# Patient Record
Sex: FEMALE | Race: WHITE | NOT HISPANIC OR LATINO | Employment: UNEMPLOYED | URBAN - METROPOLITAN AREA
[De-identification: names, ages, dates, MRNs, and addresses within clinical notes are randomized per-mention and may not be internally consistent; named-entity substitution may affect disease eponyms.]

---

## 2021-09-29 ENCOUNTER — TRANSCRIBE ORDERS (OUTPATIENT)
Dept: PREADMISSION TESTING | Facility: HOSPITAL | Age: 56
End: 2021-09-29

## 2021-09-30 ENCOUNTER — HOSPITAL ENCOUNTER (OUTPATIENT)
Dept: GENERAL RADIOLOGY | Facility: HOSPITAL | Age: 56
Discharge: HOME | End: 2021-09-30

## 2021-09-30 ENCOUNTER — PRE-ADMISSION TESTING (OUTPATIENT)
Dept: PREADMISSION TESTING | Facility: HOSPITAL | Age: 56
End: 2021-09-30

## 2021-09-30 VITALS
RESPIRATION RATE: 20 BRPM | DIASTOLIC BLOOD PRESSURE: 91 MMHG | HEIGHT: 60 IN | WEIGHT: 158 LBS | TEMPERATURE: 99 F | SYSTOLIC BLOOD PRESSURE: 133 MMHG | BODY MASS INDEX: 31.02 KG/M2 | HEART RATE: 94 BPM | OXYGEN SATURATION: 98 %

## 2021-09-30 LAB
ALBUMIN SERPL-MCNC: 4.6 G/DL (ref 3.5–5.2)
ALBUMIN/GLOB SERPL: 2 G/DL
ALP SERPL-CCNC: 64 U/L (ref 39–117)
ALT SERPL W P-5'-P-CCNC: 32 U/L (ref 1–33)
ANION GAP SERPL CALCULATED.3IONS-SCNC: 14.1 MMOL/L (ref 5–15)
AST SERPL-CCNC: 32 U/L (ref 1–32)
BILIRUB SERPL-MCNC: 0.4 MG/DL (ref 0–1.2)
BUN SERPL-MCNC: 20 MG/DL (ref 6–20)
BUN/CREAT SERPL: 23.3 (ref 7–25)
CALCIUM SPEC-SCNC: 9.5 MG/DL (ref 8.6–10.5)
CHLORIDE SERPL-SCNC: 102 MMOL/L (ref 98–107)
CO2 SERPL-SCNC: 23.9 MMOL/L (ref 22–29)
CREAT SERPL-MCNC: 0.86 MG/DL (ref 0.57–1)
DEPRECATED RDW RBC AUTO: 43.8 FL (ref 37–54)
ERYTHROCYTE [DISTWIDTH] IN BLOOD BY AUTOMATED COUNT: 12.8 % (ref 12.3–15.4)
GFR SERPL CREATININE-BSD FRML MDRD: 68 ML/MIN/1.73
GLOBULIN UR ELPH-MCNC: 2.3 GM/DL
GLUCOSE SERPL-MCNC: 156 MG/DL (ref 65–99)
HBA1C MFR BLD: 5.72 % (ref 4.8–5.6)
HCT VFR BLD AUTO: 39.2 % (ref 34–46.6)
HGB BLD-MCNC: 13.2 G/DL (ref 12–15.9)
INR PPP: 0.93 (ref 0.9–1.1)
MCH RBC QN AUTO: 31.6 PG (ref 26.6–33)
MCHC RBC AUTO-ENTMCNC: 33.7 G/DL (ref 31.5–35.7)
MCV RBC AUTO: 93.8 FL (ref 79–97)
PLATELET # BLD AUTO: 305 10*3/MM3 (ref 140–450)
PMV BLD AUTO: 9.3 FL (ref 6–12)
POTASSIUM SERPL-SCNC: 3.8 MMOL/L (ref 3.5–5.2)
PROT SERPL-MCNC: 6.9 G/DL (ref 6–8.5)
PROTHROMBIN TIME: 12.3 SECONDS (ref 11.7–14.2)
QT INTERVAL: 393 MS
RBC # BLD AUTO: 4.18 10*6/MM3 (ref 3.77–5.28)
SODIUM SERPL-SCNC: 140 MMOL/L (ref 136–145)
WBC # BLD AUTO: 8.58 10*3/MM3 (ref 3.4–10.8)

## 2021-09-30 PROCEDURE — 36415 COLL VENOUS BLD VENIPUNCTURE: CPT

## 2021-09-30 PROCEDURE — 93005 ELECTROCARDIOGRAM TRACING: CPT

## 2021-09-30 PROCEDURE — 73560 X-RAY EXAM OF KNEE 1 OR 2: CPT

## 2021-09-30 PROCEDURE — 83036 HEMOGLOBIN GLYCOSYLATED A1C: CPT

## 2021-09-30 PROCEDURE — 85610 PROTHROMBIN TIME: CPT

## 2021-09-30 PROCEDURE — 71046 X-RAY EXAM CHEST 2 VIEWS: CPT

## 2021-09-30 PROCEDURE — 85027 COMPLETE CBC AUTOMATED: CPT

## 2021-09-30 PROCEDURE — 93010 ELECTROCARDIOGRAM REPORT: CPT | Performed by: INTERNAL MEDICINE

## 2021-09-30 PROCEDURE — 80053 COMPREHEN METABOLIC PANEL: CPT

## 2021-09-30 RX ORDER — ATORVASTATIN CALCIUM 20 MG/1
20 TABLET, FILM COATED ORAL NIGHTLY
COMMUNITY

## 2021-09-30 RX ORDER — SENNOSIDES 8.6 MG
1300 CAPSULE ORAL EVERY 8 HOURS PRN
COMMUNITY
End: 2021-10-13 | Stop reason: HOSPADM

## 2021-09-30 RX ORDER — VENLAFAXINE HYDROCHLORIDE 150 MG/1
150 CAPSULE, EXTENDED RELEASE ORAL EVERY MORNING
COMMUNITY

## 2021-09-30 RX ORDER — DICLOFENAC SODIUM 75 MG/1
75 TABLET, DELAYED RELEASE ORAL 2 TIMES DAILY
COMMUNITY

## 2021-09-30 RX ORDER — LEVOTHYROXINE SODIUM 0.1 MG/1
100 TABLET ORAL
COMMUNITY

## 2021-09-30 RX ORDER — HYDROCHLOROTHIAZIDE 12.5 MG/1
12.5 TABLET ORAL EVERY MORNING
COMMUNITY

## 2021-09-30 ASSESSMENT — KOOS JR
KOOS JR SCORE: 23
KOOS JR SCORE: 28.251

## 2021-10-01 NOTE — PAT
Eval by APRN prior to RTKA.  Has had prev L knee scope but this is first joint replacement.  No issues w/ anesthesia prev.  PMH includes OA, hyperlipidemia, thyroid disorder, and tobacco use.  Denies any recent acute illnesses.  No c/o except for knee pain.  Has avoided Covid , but is not vaccinated.  Exam unremarkable.  Neuro grossly intact.  Chest CTA but harsh bilat.  S1S2 RRR.  No edema noted.    All PAT testing reviewed and may proceed with surgery.

## 2021-10-11 ENCOUNTER — TELEPHONE (OUTPATIENT)
Dept: ORTHOPEDIC SURGERY | Facility: HOSPITAL | Age: 56
End: 2021-10-11

## 2021-10-11 ENCOUNTER — LAB (OUTPATIENT)
Dept: LAB | Facility: HOSPITAL | Age: 56
End: 2021-10-11

## 2021-10-11 DIAGNOSIS — Z20.822 ENCOUNTER FOR LABORATORY TESTING FOR COVID-19 VIRUS: Primary | ICD-10-CM

## 2021-10-11 PROCEDURE — C9803 HOPD COVID-19 SPEC COLLECT: HCPCS

## 2021-10-11 PROCEDURE — U0004 COV-19 TEST NON-CDC HGH THRU: HCPCS

## 2021-10-11 NOTE — TELEPHONE ENCOUNTER
Pain Risk:  ? Currently on narcotics  ? ETOH > 3 drinks/day  ? Pain management patient  ? Current cannaboid use  No issues  Cardiac-Neuro Risk:  ? Arrhythmias  ? Stent/MI  ? Pacer  ? Heart Failure  ? Stroke with residual Choose an item.  No issues  Respiratory Risk:  ? Sleep apnea  ? CPAP machine use  ? Nightly snoring  ? Asthma/COPD   No issues  Diabetic Risk:  HgA1C (within 90 days prior to PAT):  5.72  ? Insulin use  ? More than 1 diabetic medication  No issues   Urinary retention:  No    Caregiver 24-48hrs post-discharge: Home with daughter    Home needs:  ? None/will need before discharge  ? Have walker and/or cane  ? Steps 3 steps to get into the house   Discharge Plan:    [address] [facility]    Prescriptions: Meds to bed      Home medications:   ? Blood thinner/anti-coag therapy  ? BPH or diuretic- HCTZ  ? BP meds  Educate patient on spinal anesthesia/pain control:  ? patient verbalize understanding    Educate patient on hospital course/timeline:  ?  patient verbalize understanding    Joint Care Class:  ?  yes ? no  Doing it this week.

## 2021-10-12 LAB — SARS-COV-2 RNA NOSE QL NAA+PROBE: NOT DETECTED

## 2021-10-13 ENCOUNTER — ANESTHESIA (OUTPATIENT)
Dept: PERIOP | Facility: HOSPITAL | Age: 56
End: 2021-10-13

## 2021-10-13 ENCOUNTER — HOSPITAL ENCOUNTER (OUTPATIENT)
Facility: HOSPITAL | Age: 56
Setting detail: HOSPITAL OUTPATIENT SURGERY
Discharge: HOME HEALTH | End: 2021-10-13
Attending: ORTHOPAEDIC SURGERY | Admitting: ORTHOPAEDIC SURGERY

## 2021-10-13 ENCOUNTER — ANESTHESIA EVENT (OUTPATIENT)
Dept: PERIOP | Facility: HOSPITAL | Age: 56
End: 2021-10-13

## 2021-10-13 ENCOUNTER — APPOINTMENT (OUTPATIENT)
Dept: GENERAL RADIOLOGY | Facility: HOSPITAL | Age: 56
End: 2021-10-13

## 2021-10-13 VITALS
HEART RATE: 80 BPM | BODY MASS INDEX: 31.41 KG/M2 | OXYGEN SATURATION: 97 % | DIASTOLIC BLOOD PRESSURE: 72 MMHG | RESPIRATION RATE: 16 BRPM | HEIGHT: 60 IN | TEMPERATURE: 97.8 F | WEIGHT: 160 LBS | SYSTOLIC BLOOD PRESSURE: 112 MMHG

## 2021-10-13 DIAGNOSIS — Z96.651 TOTAL KNEE REPLACEMENT STATUS, RIGHT: Primary | ICD-10-CM

## 2021-10-13 PROCEDURE — C1713 ANCHOR/SCREW BN/BN,TIS/BN: HCPCS | Performed by: ORTHOPAEDIC SURGERY

## 2021-10-13 PROCEDURE — 25010000002 ROPIVACAINE PER 1 MG

## 2021-10-13 PROCEDURE — 25010000002 KETOROLAC TROMETHAMINE PER 15 MG

## 2021-10-13 PROCEDURE — C1776 JOINT DEVICE (IMPLANTABLE): HCPCS | Performed by: ORTHOPAEDIC SURGERY

## 2021-10-13 PROCEDURE — C1889 IMPLANT/INSERT DEVICE, NOC: HCPCS | Performed by: ORTHOPAEDIC SURGERY

## 2021-10-13 PROCEDURE — 25010000003 CEFAZOLIN IN DEXTROSE 2-4 GM/100ML-% SOLUTION: Performed by: ORTHOPAEDIC SURGERY

## 2021-10-13 PROCEDURE — 97110 THERAPEUTIC EXERCISES: CPT

## 2021-10-13 PROCEDURE — 25010000002 DEXAMETHASONE PER 1 MG: Performed by: NURSE ANESTHETIST, CERTIFIED REGISTERED

## 2021-10-13 PROCEDURE — 25010000002 KETOROLAC TROMETHAMINE PER 15 MG: Performed by: NURSE ANESTHETIST, CERTIFIED REGISTERED

## 2021-10-13 PROCEDURE — 73560 X-RAY EXAM OF KNEE 1 OR 2: CPT

## 2021-10-13 PROCEDURE — 97161 PT EVAL LOW COMPLEX 20 MIN: CPT

## 2021-10-13 PROCEDURE — 25010000002 CLONIDINE PER 1 MG

## 2021-10-13 PROCEDURE — 25010000002 ONDANSETRON PER 1 MG: Performed by: NURSE ANESTHETIST, CERTIFIED REGISTERED

## 2021-10-13 PROCEDURE — 25010000002 MIDAZOLAM PER 1 MG: Performed by: ANESTHESIOLOGY

## 2021-10-13 PROCEDURE — 25010000002 PROPOFOL 10 MG/ML EMULSION: Performed by: NURSE ANESTHETIST, CERTIFIED REGISTERED

## 2021-10-13 PROCEDURE — 25010000002 EPINEPHRINE 1 MG/ML SOLUTION

## 2021-10-13 DEVICE — CMT BONE PALACOS R HI/VISC 1X40: Type: IMPLANTABLE DEVICE | Site: KNEE | Status: FUNCTIONAL

## 2021-10-13 DEVICE — TIBIAL INSERT FIXED SPHERE FLEX #2/12 MM R
Type: IMPLANTABLE DEVICE | Site: KNEE | Status: FUNCTIONAL
Brand: GMK SPHERE TOTAL KNEE SYSTEM

## 2021-10-13 DEVICE — TOTL KN: Type: IMPLANTABLE DEVICE | Site: KNEE | Status: FUNCTIONAL

## 2021-10-13 DEVICE — FIXED TIBIAL TRAY CEMENTED RIGHT, SIZE 2
Type: IMPLANTABLE DEVICE | Site: KNEE | Status: FUNCTIONAL
Brand: GMK PRIMARY TOTAL KNEE SYSTEM

## 2021-10-13 DEVICE — DEV CONTRL TISS STRATAFIX SPIRAL MNCRYL UD 3/0 PLS 30CM: Type: IMPLANTABLE DEVICE | Site: KNEE | Status: FUNCTIONAL

## 2021-10-13 DEVICE — FEMUR SPHERE CEMENTED RIGHT, SIZE 3
Type: IMPLANTABLE DEVICE | Site: KNEE | Status: FUNCTIONAL
Brand: GMK SPHERE TOTAL KNEE SYSTEM

## 2021-10-13 DEVICE — DEV CONTRL TISS STRATAFIX SYMM PDS PLUS VIL CT-1 60CM: Type: IMPLANTABLE DEVICE | Site: KNEE | Status: FUNCTIONAL

## 2021-10-13 RX ORDER — FENTANYL CITRATE 50 UG/ML
50 INJECTION, SOLUTION INTRAMUSCULAR; INTRAVENOUS
Status: DISCONTINUED | OUTPATIENT
Start: 2021-10-13 | End: 2021-10-13 | Stop reason: HOSPADM

## 2021-10-13 RX ORDER — ONDANSETRON 2 MG/ML
INJECTION INTRAMUSCULAR; INTRAVENOUS AS NEEDED
Status: DISCONTINUED | OUTPATIENT
Start: 2021-10-13 | End: 2021-10-13 | Stop reason: SURG

## 2021-10-13 RX ORDER — EPHEDRINE SULFATE 50 MG/ML
5 INJECTION, SOLUTION INTRAVENOUS ONCE AS NEEDED
Status: DISCONTINUED | OUTPATIENT
Start: 2021-10-13 | End: 2021-10-13 | Stop reason: HOSPADM

## 2021-10-13 RX ORDER — BUPIVACAINE HYDROCHLORIDE 7.5 MG/ML
INJECTION, SOLUTION EPIDURAL; RETROBULBAR
Status: COMPLETED | OUTPATIENT
Start: 2021-01-04 | End: 2021-10-13

## 2021-10-13 RX ORDER — DEXAMETHASONE SODIUM PHOSPHATE 4 MG/ML
INJECTION, SOLUTION INTRA-ARTICULAR; INTRALESIONAL; INTRAMUSCULAR; INTRAVENOUS; SOFT TISSUE AS NEEDED
Status: DISCONTINUED | OUTPATIENT
Start: 2021-10-13 | End: 2021-10-13 | Stop reason: SURG

## 2021-10-13 RX ORDER — MIDAZOLAM HYDROCHLORIDE 1 MG/ML
1 INJECTION INTRAMUSCULAR; INTRAVENOUS
Status: COMPLETED | OUTPATIENT
Start: 2021-10-13 | End: 2021-10-13

## 2021-10-13 RX ORDER — ACETAMINOPHEN 10 MG/ML
1000 INJECTION, SOLUTION INTRAVENOUS ONCE
Status: DISCONTINUED | OUTPATIENT
Start: 2021-10-13 | End: 2021-10-13 | Stop reason: HOSPADM

## 2021-10-13 RX ORDER — MAGNESIUM HYDROXIDE 1200 MG/15ML
LIQUID ORAL AS NEEDED
Status: DISCONTINUED | OUTPATIENT
Start: 2021-10-13 | End: 2021-10-13 | Stop reason: HOSPADM

## 2021-10-13 RX ORDER — CEFAZOLIN SODIUM 2 G/100ML
2 INJECTION, SOLUTION INTRAVENOUS ONCE
Status: COMPLETED | OUTPATIENT
Start: 2021-10-13 | End: 2021-10-13

## 2021-10-13 RX ORDER — ONDANSETRON 2 MG/ML
4 INJECTION INTRAMUSCULAR; INTRAVENOUS ONCE AS NEEDED
Status: DISCONTINUED | OUTPATIENT
Start: 2021-10-13 | End: 2021-10-13 | Stop reason: HOSPADM

## 2021-10-13 RX ORDER — FLUMAZENIL 0.1 MG/ML
0.2 INJECTION INTRAVENOUS AS NEEDED
Status: DISCONTINUED | OUTPATIENT
Start: 2021-10-13 | End: 2021-10-13 | Stop reason: HOSPADM

## 2021-10-13 RX ORDER — DIPHENHYDRAMINE HCL 25 MG
25 CAPSULE ORAL
Status: DISCONTINUED | OUTPATIENT
Start: 2021-10-13 | End: 2021-10-13 | Stop reason: HOSPADM

## 2021-10-13 RX ORDER — HYDROCODONE BITARTRATE AND ACETAMINOPHEN 7.5; 325 MG/1; MG/1
1 TABLET ORAL ONCE AS NEEDED
Status: COMPLETED | OUTPATIENT
Start: 2021-10-13 | End: 2021-10-13

## 2021-10-13 RX ORDER — IBUPROFEN 600 MG/1
600 TABLET ORAL ONCE AS NEEDED
Status: DISCONTINUED | OUTPATIENT
Start: 2021-10-13 | End: 2021-10-13 | Stop reason: HOSPADM

## 2021-10-13 RX ORDER — ASPIRIN 81 MG/1
81 TABLET ORAL 2 TIMES DAILY
Qty: 60 TABLET | Refills: 0 | Status: SHIPPED | OUTPATIENT
Start: 2021-10-13 | End: 2021-11-12

## 2021-10-13 RX ORDER — KETOROLAC TROMETHAMINE 30 MG/ML
INJECTION, SOLUTION INTRAMUSCULAR; INTRAVENOUS AS NEEDED
Status: DISCONTINUED | OUTPATIENT
Start: 2021-10-13 | End: 2021-10-13 | Stop reason: SURG

## 2021-10-13 RX ORDER — LIDOCAINE HYDROCHLORIDE 20 MG/ML
INJECTION, SOLUTION INFILTRATION; PERINEURAL AS NEEDED
Status: DISCONTINUED | OUTPATIENT
Start: 2021-10-13 | End: 2021-10-13 | Stop reason: SURG

## 2021-10-13 RX ORDER — DIPHENHYDRAMINE HYDROCHLORIDE 50 MG/ML
12.5 INJECTION INTRAMUSCULAR; INTRAVENOUS
Status: DISCONTINUED | OUTPATIENT
Start: 2021-10-13 | End: 2021-10-13 | Stop reason: HOSPADM

## 2021-10-13 RX ORDER — NALOXONE HCL 0.4 MG/ML
0.2 VIAL (ML) INJECTION AS NEEDED
Status: DISCONTINUED | OUTPATIENT
Start: 2021-10-13 | End: 2021-10-13 | Stop reason: HOSPADM

## 2021-10-13 RX ORDER — PROPOFOL 10 MG/ML
VIAL (ML) INTRAVENOUS AS NEEDED
Status: DISCONTINUED | OUTPATIENT
Start: 2021-10-13 | End: 2021-10-13 | Stop reason: SURG

## 2021-10-13 RX ORDER — FAMOTIDINE 10 MG/ML
20 INJECTION, SOLUTION INTRAVENOUS ONCE
Status: COMPLETED | OUTPATIENT
Start: 2021-10-13 | End: 2021-10-13

## 2021-10-13 RX ORDER — HYDROCODONE BITARTRATE AND ACETAMINOPHEN 7.5; 325 MG/1; MG/1
1-2 TABLET ORAL EVERY 6 HOURS PRN
Qty: 50 TABLET | Refills: 0 | Status: SHIPPED | OUTPATIENT
Start: 2021-10-13 | End: 2022-02-10

## 2021-10-13 RX ORDER — TRAMADOL HYDROCHLORIDE 50 MG/1
50 TABLET ORAL EVERY 8 HOURS PRN
Qty: 30 TABLET | Refills: 0 | Status: SHIPPED | OUTPATIENT
Start: 2021-10-13 | End: 2022-02-10

## 2021-10-13 RX ORDER — HYDROMORPHONE HYDROCHLORIDE 1 MG/ML
0.5 INJECTION, SOLUTION INTRAMUSCULAR; INTRAVENOUS; SUBCUTANEOUS
Status: DISCONTINUED | OUTPATIENT
Start: 2021-10-13 | End: 2021-10-13 | Stop reason: HOSPADM

## 2021-10-13 RX ORDER — LABETALOL HYDROCHLORIDE 5 MG/ML
5 INJECTION, SOLUTION INTRAVENOUS
Status: DISCONTINUED | OUTPATIENT
Start: 2021-10-13 | End: 2021-10-13 | Stop reason: HOSPADM

## 2021-10-13 RX ORDER — LIDOCAINE HYDROCHLORIDE 10 MG/ML
0.5 INJECTION, SOLUTION EPIDURAL; INFILTRATION; INTRACAUDAL; PERINEURAL ONCE AS NEEDED
Status: DISCONTINUED | OUTPATIENT
Start: 2021-10-13 | End: 2021-10-13 | Stop reason: HOSPADM

## 2021-10-13 RX ORDER — HYDROCODONE BITARTRATE AND ACETAMINOPHEN 7.5; 325 MG/1; MG/1
1 TABLET ORAL ONCE AS NEEDED
Status: CANCELLED | OUTPATIENT
Start: 2021-10-13 | End: 2021-10-20

## 2021-10-13 RX ORDER — OXYCODONE AND ACETAMINOPHEN 10; 325 MG/1; MG/1
1 TABLET ORAL EVERY 4 HOURS PRN
Status: DISCONTINUED | OUTPATIENT
Start: 2021-10-13 | End: 2021-10-13 | Stop reason: HOSPADM

## 2021-10-13 RX ORDER — SODIUM CHLORIDE 0.9 % (FLUSH) 0.9 %
3 SYRINGE (ML) INJECTION EVERY 12 HOURS SCHEDULED
Status: DISCONTINUED | OUTPATIENT
Start: 2021-10-13 | End: 2021-10-13 | Stop reason: HOSPADM

## 2021-10-13 RX ORDER — PROMETHAZINE HYDROCHLORIDE 25 MG/1
25 SUPPOSITORY RECTAL ONCE AS NEEDED
Status: DISCONTINUED | OUTPATIENT
Start: 2021-10-13 | End: 2021-10-13 | Stop reason: HOSPADM

## 2021-10-13 RX ORDER — PROMETHAZINE HYDROCHLORIDE 25 MG/1
25 TABLET ORAL ONCE AS NEEDED
Status: DISCONTINUED | OUTPATIENT
Start: 2021-10-13 | End: 2021-10-13 | Stop reason: HOSPADM

## 2021-10-13 RX ORDER — ONDANSETRON 4 MG/1
4 TABLET, FILM COATED ORAL ONCE AS NEEDED
Status: CANCELLED | OUTPATIENT
Start: 2021-10-13

## 2021-10-13 RX ORDER — SODIUM CHLORIDE 0.9 % (FLUSH) 0.9 %
3-10 SYRINGE (ML) INJECTION AS NEEDED
Status: DISCONTINUED | OUTPATIENT
Start: 2021-10-13 | End: 2021-10-13 | Stop reason: HOSPADM

## 2021-10-13 RX ORDER — HYDRALAZINE HYDROCHLORIDE 20 MG/ML
5 INJECTION INTRAMUSCULAR; INTRAVENOUS
Status: DISCONTINUED | OUTPATIENT
Start: 2021-10-13 | End: 2021-10-13 | Stop reason: HOSPADM

## 2021-10-13 RX ORDER — TRANEXAMIC ACID 100 MG/ML
INJECTION, SOLUTION INTRAVENOUS AS NEEDED
Status: DISCONTINUED | OUTPATIENT
Start: 2021-10-13 | End: 2021-10-13 | Stop reason: SURG

## 2021-10-13 RX ORDER — SODIUM CHLORIDE, SODIUM LACTATE, POTASSIUM CHLORIDE, CALCIUM CHLORIDE 600; 310; 30; 20 MG/100ML; MG/100ML; MG/100ML; MG/100ML
9 INJECTION, SOLUTION INTRAVENOUS CONTINUOUS
Status: DISCONTINUED | OUTPATIENT
Start: 2021-10-13 | End: 2021-10-13 | Stop reason: HOSPADM

## 2021-10-13 RX ADMIN — MIDAZOLAM 1 MG: 1 INJECTION INTRAMUSCULAR; INTRAVENOUS at 07:51

## 2021-10-13 RX ADMIN — TRANEXAMIC ACID 1000 MG: 1 INJECTION, SOLUTION INTRAVENOUS at 09:13

## 2021-10-13 RX ADMIN — CEFAZOLIN SODIUM 2 G: 2 INJECTION, SOLUTION INTRAVENOUS at 08:44

## 2021-10-13 RX ADMIN — DEXAMETHASONE SODIUM PHOSPHATE 8 MG: 4 INJECTION, SOLUTION INTRAMUSCULAR; INTRAVENOUS at 09:13

## 2021-10-13 RX ADMIN — HYDROCODONE BITARTRATE AND ACETAMINOPHEN 1 TABLET: 7.5; 325 TABLET ORAL at 14:29

## 2021-10-13 RX ADMIN — PROPOFOL 80 MG: 10 INJECTION, EMULSION INTRAVENOUS at 09:06

## 2021-10-13 RX ADMIN — LIDOCAINE HYDROCHLORIDE 60 MG: 20 INJECTION, SOLUTION INFILTRATION; PERINEURAL at 09:06

## 2021-10-13 RX ADMIN — SODIUM CHLORIDE, POTASSIUM CHLORIDE, SODIUM LACTATE AND CALCIUM CHLORIDE 9 ML/HR: 600; 310; 30; 20 INJECTION, SOLUTION INTRAVENOUS at 07:34

## 2021-10-13 RX ADMIN — PROPOFOL 100 MCG/KG/MIN: 10 INJECTION, EMULSION INTRAVENOUS at 09:07

## 2021-10-13 RX ADMIN — SODIUM CHLORIDE, POTASSIUM CHLORIDE, SODIUM LACTATE AND CALCIUM CHLORIDE: 600; 310; 30; 20 INJECTION, SOLUTION INTRAVENOUS at 10:47

## 2021-10-13 RX ADMIN — BUPIVACAINE HYDROCHLORIDE 2 ML: 7.5 INJECTION, SOLUTION EPIDURAL; RETROBULBAR at 09:01

## 2021-10-13 RX ADMIN — MIDAZOLAM 1 MG: 1 INJECTION INTRAMUSCULAR; INTRAVENOUS at 08:44

## 2021-10-13 RX ADMIN — TRANEXAMIC ACID 1000 MG: 1 INJECTION, SOLUTION INTRAVENOUS at 10:22

## 2021-10-13 RX ADMIN — ONDANSETRON 4 MG: 2 INJECTION INTRAMUSCULAR; INTRAVENOUS at 10:28

## 2021-10-13 RX ADMIN — KETOROLAC TROMETHAMINE 30 MG: 30 INJECTION, SOLUTION INTRAMUSCULAR at 10:28

## 2021-10-13 RX ADMIN — OXYCODONE HYDROCHLORIDE AND ACETAMINOPHEN 1 TABLET: 10; 325 TABLET ORAL at 11:17

## 2021-10-13 RX ADMIN — FAMOTIDINE 20 MG: 10 INJECTION, SOLUTION INTRAVENOUS at 07:53

## 2021-10-13 NOTE — DISCHARGE PLACEMENT REQUEST
"Prakash Fagan (56 y.o. Female)             Date of Birth Social Security Number Address Home Phone MRN    1965  [address]  [place] [facility] [address] [phone] [id number]    Latter-day Marital Status             None Legally        Admission Date Admission Type Admitting Provider Attending Provider Department, Room/Bed    10/13/21 Elective Sid Ryan MD Keller, Tyler, MD [facility] MAIN OR, [facility] Main OR/MAIN OR    Discharge Date Discharge Disposition Discharge Destination           Home-Health Care [facility]              Attending Provider: Sid Ryan MD    Allergies: Keflex [Cephalexin]    Isolation: None   Infection: None   Code Status: Not on file   Advance Care Planning Activity    Ht: 152.4 cm (60\")   Wt: 72.6 kg (160 lb)    Admission Cmt: None   Principal Problem: None                Active Insurance as of 10/13/2021     Primary Coverage     Payor Plan Insurance Group Employer/Plan Group    ANTHEM MEDICAID ANTHEM MEDICAID [id number]     Payor Plan Address Payor Plan Phone Number Payor Plan Fax Number Effective Dates    [address] [address] [phone]  2/1/2021 - None Entered    [facility] [address]       Subscriber Name Subscriber Birth Date Member ID       PRAKASH FAGAN 1965 [id number]                 Emergency Contacts      (Rel.) Home Phone Work Phone Mobile Phone    alberto thomas (Daughter) -- -- [phone]              "

## 2021-10-13 NOTE — CASE MANAGEMENT/SOCIAL WORK
Discharge Planning Assessment  [facility]     Patient Name: Sherri Phillips  MRN: [id number]  Today's Date: 10/13/2021    Admit Date: 10/13/2021     Discharge Needs Assessment     Row Name 10/13/21 1353       Living Environment    Lives With child(janet), adult    Current Living Arrangements home/apartment/condo    Primary Care Provided by self    Provides Primary Care For no one    Family Caregiver if Needed child(janet), adult    Quality of Family Relationships helpful; involved; supportive    Able to Return to Prior Arrangements yes       Resource/Environmental Concerns    Transportation Concerns car, none       Transition Planning    Patient/Family Anticipates Transition to home with family; home with help/services    Patient/Family Anticipated Services at Transition     Transportation Anticipated family or friend will provide       Discharge Needs Assessment    Readmission Within the Last 30 Days no previous admission in last 30 days    Equipment Currently Used at Home none    Discharge Facility/Level of Care Needs home with home health    Provided Post Acute Provider List? Yes    Post Acute Provider List Home Health    Patient's Choice of Community Agency(s) [facility] referrals.               Discharge Plan     Row Name 10/13/21 9410       Plan    Plan Home with family support & HH.    Patient/Family in Agreement with Plan yes    Plan Comments Spoke with the patient, verified current information and explained the role of the CCP. Patient said she has family support. She's IADL and has no history with DME/RH/HH. Patient plans to d/c home with family support & HH PT. Careplan received from Dr. Ryan's Office which plans for the pt to d/c home with [facility]. Unfortunately [facility] does not serve the [facility] area. Updated the patient who's agreeable and is also agreeable with [facility] referrals. Referrals sent in [facility]. Spoke with [facility] who is not in-network with the pt's insurance. Spoke with  [facility] who said they are not in-network with the pt's insurance. Spoke with Sim  who is in-network with the pt's insurance. [facility] faxed the requested clinicals to [facility] at [phone]. Await their determination.              Continued Care and Services - Admitted Since 10/13/2021     Home Medical Care     Service Provider Request Status Selected Services Address Phone Fax Patient Preferred    [facility]  Pending - Request Sent N/A [id number] JACQUELYN RIBEIRO [address] [place] [address] [phone] --              Expected Discharge Date and Time     Expected Discharge Date Expected Discharge Time    Oct 13, 2021          Demographic Summary     Row Name 10/13/21 1350       General Information    Admission Type same day    Reason for Consult discharge planning    Preferred Language English     Used During This Interaction no       Contact Information    Permission Granted to Share Info With ; family/designee               Functional Status     Row Name 10/13/21 1350       Functional Status    Usual Activity Tolerance good       Functional Status, IADL    Medications independent    Meal Preparation independent    Housekeeping independent    Laundry independent    Shopping independent       Mental Status Summary    Recent Changes in Mental Status/Cognitive Functioning no changes               Psychosocial     Row Name 10/13/21 1350       Intellectual Performance WDL    Level of Consciousness Alert       Coping/Stress    Patient Personal Strengths able to adapt    Sources of Support adult child(janet)    Reaction to Health Status accepting    Understanding of Condition and Treatment adequate understanding of medical condition       Developmental Stage (Erdaciasson's)    Developmental Stage Stage 7 (35-65 years/Middle Adulthood) Generativity vs. Stagnation               Abuse/Neglect    No documentation.                Legal     No documentation.                Substance Abuse    No documentation.                Patient Forms    No documentation.                   Whitney Matute RN

## 2021-10-13 NOTE — PERIOPERATIVE NURSING NOTE
Dr Ryan aware of Keflex allergy (rash, stated appox. 42 yrs ago)- MD discussed with patient in pre-op & does want Keflex to be given as ordered pre-op. Patient agreed to be given Keflex today.  Dr Bobby also aware.

## 2021-10-13 NOTE — PLAN OF CARE
Goal Outcome Evaluation:  Plan of Care Reviewed With: patient, daughter           Outcome Summary: POD 0 R TKR, PT this visit consisted of ROM and strengthening ex, and functional mobility training consisting of gait and stair training, pt did well and was able to walk 150 ft with Rwx with supervision and go up and down 3 stairs without difficulty, pt performed ex well, pt is appropriate to go home today with assist and home health  Patient was intermittently wearing a face mask during this therapy encounter. Therapist used appropriate personal protective equipment including eye protection, mask, and gloves.  Mask used was standard procedure mask. Appropriate PPE was worn during the entire therapy session. Hand hygiene was completed before and after therapy session. Patient is not in enhanced droplet precautions.

## 2021-10-13 NOTE — OP NOTE
Right TOTAL KNEE ARTHROPLASTY  Procedure Note    Sherri Phillips  10/13/2021    Pre-op Diagnosis: Osteoarthritis right knee primary generalized  Post-op Diagnosis:Same  Procedure: Right total Knee Arthroplasty cpt 21490  Surgeon:  Sid Ryan MD  Assistant:   Thanh Garcia NP  The services of a first assist were necessary for performing the procedure safely and expeditiously.  The first assist was present for the entire duration of the case and helped with positioning, retraction and closure of the incision.    Anesthesia: Spinal, Anesthesiologist: Isaiah Gorman MD  CRNA: Enio Hernández CRNA  Staff: Circulator: Nadia Trejo RN; Jillian Sebastian RN  Scrub Person: Maryam Reese CFA  Estimated Blood Loss: minimal  Specimens: * No orders in the log *  Drains: none  Complications: None    Components Utilized:   Implant Name Type Inv. Item Serial No.  Lot No. LRB No. Used Action   CMT BONE PALACOS R HI/VISC 1X40 - [id number] Implant CMT BONE PALACOS R HI/VISC 1X40  [facility] [id number] Right 1 Implanted   CMT BONE PALACOS R HI/VISC 1X40 - [id number] Implant CMT BONE PALACOS R HI/VISC 1X40  [employer] [facility] [id number] Right 1 Implanted   DEV CONTRL TISS STRATAFIX SPIRAL MNCRYL UD 3/0 PLS 30CM - [id number] Implant DEV CONTRL TISS STRATAFIX SPIRAL MNCRYL UD 3/0 PLS 30CM  [employer]  DIV OF J AND J . Right 1 Implanted   DEV CONTRL TISS STRATAFIX SYMM PDS PLUS SHERRIE CT-1 60CM - [id number] Implant DEV CONTRL TISS STRATAFIX SYMM PDS PLUS SHERRIE CT-1 60CM  [employer]  DIV OF J [employer] J . Right 1 Implanted   BASEPLT TIB/KN GMK SPH COCR SZ2 RT - [id number] Implant BASEPLT TIB/KN GMK SPH COCR SZ2 RT  [employer] [id number] Right 1 Implanted   COMP FEM/KN GMKSPHERE CMT COCR SZ3 RT - [id number] Implant COMP FEM/KN GMKSPHERE CMT COCR SZ3 RT  [employer] [id number] Right 1 Implanted   INSRT TIB/KN GMKSPHERE FLEX UHMWPE SZ2 12MM RT - [id number] Implant INSRT TIB/KN GMKSPHERE FLEX UHMWPE SZ2 12MM RT   [employer] [id number] Right 1 Implanted         Indication for Procedure:   The patient is a 56 y.o. female presents today for a total knee arthroplasty procedure because of failure to conservatively manage the patient's pain for arthritis.  The patient was educated in risks of surgery that could include possible risk of infection, deep venous thrombosis, pulmonary embolism, fracture, neurovascular injury, leg length discrepancy, dislocation, possible persistent pain, need for additional surgeries, anesthetic risks, medical risks including heart attack and stroke, and death.  The discussion occurred in the office pre-operatively, and patient had the opportunity to ask questions, and concerns about the proposed surgery.  The patient also understood that medicine is not an exact science, and that outcomes of the surgical procedure may be less than desired. The patient wished to proceed.      Protocols for intravenous antibiotics and venous thrombosis were followed for this patient.  IV antibiotics were infused prior to surgery and will be discontinued within 24 hours of completion of the surgical procedure.  Thrombosis prophylaxis will be initiated within 24 hours of the completion of the surgical procedure.      Procedure:   After the patient was identified in the preoperative area, and the surgical site confirmed and marked, the patient was brought to the operating room on a stretcher.  They were placed supine on the operating room table and the above anesthetic was placed uneventfully.  A time-out procedure was performed.  The intravenous antibiotics infusion was completed.  A non-sterile tourniquet was placed on the operative thigh, and was prepped and draped in the usual sterile fashion.  An Esmarch was to exsanguinate the limb, and the tourniquet was inflated.     A 10 blade scalpel was used to make a longitudinal incision from above the patella to medial to the tibial tubercle.  A mid vastus arthrotomy was  performed with another 10 blade scalpel.  The fat pat was preserved and used as a medial retractor.  The medial joint line was elevated subperiosteally with electrocautery and a Medrano elevator.   The knee was examined at this point and she had considerable varus valgus laxity of the knee with a varus thrust.  She also had subluxation of her tibia about 50% anterior relative to the femur.  The patella was everted and a lateral denervation was performed as well as a lateral facet ostectomy.  The knee was then flexed and Miller's line was drawn on the distal femur with electrocautery.  Then the drill was placed into the distal femur into the medullary canal.  The cartilage was removed from the medial femoral condyle.  The intramedullary distal femoral valgus cutting guide set to 5 degrees of femoral valgus was then placed into the medullary canal.  It was then pinned and the oscillating saw was used to make the osteotomy.    Then the extramedullary cutting guide was placed aligned with the tibial eminence superiorly and the tibial shaft distally, and the cut was aligned for a neutral cut along the mechanical axis.  The oscillating saw was then used to complete the osteotomy cuts.     The 4-in-1 distal femoral cutting guide was then placed after a sizer was applied to the distal femur in 3° of external rotation.  The 4-in-1 cutting guide was placed the knee was examined flexion gaps were felt to be nearly equal medially and laterally.  The 4-in-1 cutting block was then used to make the femoral osteotomy.   A laminar  was then placed medially and then laterally to remove the medial and lateral meniscus and the posterior osteophytes.  At this point the posterior cruciate ligament was removed.  The posterior medial and posterior lateral capsule was preserved.  A spacer block was placed and the knee at flexion and the knee was balanced in flexion for a medial pivot.  The knee was then placed in extension and  confirmed to be balanced as well.  It was noted that she did have some pathologic varus laxity due to chronic stretching of her iliotibial band.  I did perform a slight medial release in attempt to make the extension gap equal.  At this point the femoral trial was applied the tibial baseplate was inserted and the tibia was floated to assess rotation.  The knee was examined again was stable throughout range of motion.   The lug holes were drilled in the distal femur.  The tibia was drilled and broached in the typical fashion.  The trial components were then removed and the final implants were confirmed and opened.  The bone surfaces were then irrigated and dried.  Periarticular injection was placed in the posterior capsule Palacos cement was then mixed and placed on the cut bone surfaces and back side of the implants.  The implants were then impacted into place, and the trial polyethylene spacer was placed and the knee was placed into extension.  A stack of towels was placed under the ankle and the cement was allowed to harden.  Once the cement had cured the knee was again ranged and confirmed to be balanced and have excellent range of motion.  The definitive tibial insert was placed.  I ended up using a size 12 tibial insert which had excellent sagittal plane stability it did have about 2 mm of lateral liftoff at terminal extension and 30 degrees of flexion in the medial portion of the knee remain engaged with a good medial pivot throughout the range of motion.  The tourniquet was then dropped.  Hemostasis was obtained.  The wound was then irrigated with the pulse lavage irrigation system with a 3 liter mixture of 0.35% Betadine and normal saline.  The local mixture was injected throughout the knee for postoperative pain control.   The arthrotomy was then closed using 0 Vicryl suture in running fashion for the mid vastus approach.  #2 Ethibond sutures were used as stay sutures to close the arthrotomy followed by #1  Stratafix PDS.  The deep dermis was closed with 2-0 Vicryl.   A 3-0 running subcuticular Monocryl stratofix suture was used followed by Dermabond to seal the skin.  . Sterile silver impregnated occlusive dressings were applied.   Sponge and needle counts were completed and were correct.  The patient was awakened from anesthetic and was returned to recovery in stable condition.      The patient will receive aspirin 81 mg twice daily for postoperative DVT prophylaxis in conjunction with sequential compression devices.    Sid Ryan MD  Orthopaedic Surgeon    [facility]  [phone]          Date: 10/13/2021  Time: 10:33 EDT

## 2021-10-13 NOTE — ANESTHESIA PROCEDURE NOTES
Spinal Block      Performed By  Anesthesiologist: Isaiah Gorman MD  CRNA: Enio Hernández CRNA  Preanesthetic Checklist  Completed: patient identified, IV checked, site marked, risks and benefits discussed, surgical consent, monitors and equipment checked, pre-op evaluation and timeout performed  Spinal Block Prep:  Patient Position:sitting  Sterile Tech:cap, gloves, sterile barriers and mask  Prep:Chloraprep  Patient Monitoring:EKG, continuous pulse oximetry and blood pressure monitoring  Spinal Block Procedure  Approach:midline  Guidance:landmark technique and palpation technique  Location:L3-L4  Needle Type:Pencan  Needle Gauge:24 G  Placement of Spinal needle event:cerebrospinal fluid aspirated  Paresthesia: no  Fluid Appearance:clear  Medications: bupivacaine PF (MARCAINE) 0.75 % injection, 2 mL  Med Administered at 10/13/2021 9:01 AM   Post Assessment  Patient Tolerance:patient tolerated the procedure well with no apparent complications  Complications no  Additional Notes  Clear CSF, positive turbidity

## 2021-10-13 NOTE — ANESTHESIA PREPROCEDURE EVALUATION
Anesthesia Evaluation     Patient summary reviewed and Nursing notes reviewed   NPO Solid Status: > 8 hours  NPO Liquid Status: > 2 hours           Airway   Mallampati: II  TM distance: >3 FB  Neck ROM: full  Dental - normal exam     Pulmonary - normal exam   (+) a smoker Current Smoked day of surgery,   Cardiovascular - normal exam    ECG reviewed    (+) hyperlipidemia,       Neuro/Psych- negative ROS  GI/Hepatic/Renal/Endo    (+) obesity,       Musculoskeletal     Abdominal    Substance History - negative use     OB/GYN negative ob/gyn ROS         Other   arthritis,                      Anesthesia Plan    ASA 3     spinal       Anesthetic plan, all risks, benefits, and alternatives have been provided, discussed and informed consent has been obtained with: patient.

## 2021-10-13 NOTE — THERAPY EVALUATION
Patient Name: Sherri Phillips  : 1965    MRN: [id number]                              Today's Date: 10/13/2021       Admit Date: 10/13/2021    Visit Dx:     ICD-10-CM ICD-9-CM   1. Total knee replacement status, right  Z96.651 V43.65     There is no problem list on file for this patient.    Past Medical History:   Diagnosis Date   • Arthritis    • Disease of thyroid gland    • Diverticular disease    • Hemorrhoid    • Hyperlipidemia    • Knee pain, right      Past Surgical History:   Procedure Laterality Date   • HYSTERECTOMY     • KNEE ARTHROSCOPY     • TUBAL COAGULATION LAPAROSCOPIC        General Information     Row Name 10/13/21 140          Physical Therapy Time and Intention    Document Type evaluation  -     Mode of Treatment physical therapy  -PC     Row Name 10/13/21 140          General Information    Patient Profile Reviewed yes  -PC     Prior Level of Function independent:  -PC     Row Name 10/13/21 1407          Living Environment    Lives With child(janet), adult  -PC     Row Name 10/13/21 140          Home Main Entrance    Number of Stairs, Main Entrance three  -PC     Stair Railings, Main Entrance railings safe and in good condition  -PC     Row Name 10/13/21 1407          Stairs Within Home, Primary    Number of Stairs, Within Home, Primary none  -PC     Row Name 10/13/21 140          Cognition    Orientation Status (Cognition) oriented x 4  -PC     Row Name 10/13/21 1407          Safety Issues, Functional Mobility    Impairments Affecting Function (Mobility) pain; range of motion (ROM); strength  -PC           User Key  (r) = Recorded By, (t) = Taken By, (c) = Cosigned By    Initials Name Provider Type    PC Kandice Dave PT Physical Therapist               Mobility     Row Name 10/13/21 140          Bed Mobility    Comment (Bed Mobility) in chair  -PC     Row Name 10/13/21 140          Sit-Stand Transfer    Sit-Stand [place] (Transfers) supervision; verbal cues  -PC      Assistive Device (Sit-Stand Transfers) walker, front-wheeled  -PC     Row Name 10/13/21 1409          Gait/Stairs (Locomotion)    [place] Level (Gait) standby assist  -PC     Assistive Device (Gait) walker, front-wheeled  -PC     Distance in Feet (Gait) 150 ft  -PC     Deviations/Abnormal Patterns (Gait) antalgic  -PC     [place] Level (Stairs) contact guard; verbal cues  -PC     Handrail Location (Stairs) right side (ascending)  -PC     Number of Steps (Stairs) 3  -PC     Ascending Technique (Stairs) step-to-step  -PC     Descending Technique (Stairs) step-to-step  -PC     Comment (Gait/Stairs) demonstrated to daughter how to assist pt on stairs  -PC           User Key  (r) = Recorded By, (t) = Taken By, (c) = Cosigned By    Initials Name Provider Type    PC Kandice Dave, PT Physical Therapist               Obj/Interventions     Row Name 10/13/21 1410          Range of Motion Comprehensive    Comment, General Range of Motion WFL x R knee  -PC     Row Name 10/13/21 1410          Strength Comprehensive (MMT)    Comment, General Manual Muscle Testing (MMT) Assessment WNL x R knee, pt can independently SLR  -PC     Row Name 10/13/21 1410          Motor Skills    Therapeutic Exercise --  10 reps TKR ex  -PC     Row Name 10/13/21 1410          Sensory Assessment (Somatosensory)    Sensory Assessment (Somatosensory) sensation intact  -PC           User Key  (r) = Recorded By, (t) = Taken By, (c) = Cosigned By    Initials Name Provider Type    PC Kandice Dave, PT Physical Therapist               Goals/Plan    No documentation.                Clinical Impression     Row Name 10/13/21 1412          Pain    Additional Documentation Pain Scale: Numbers Pre/Post-Treatment (Group)  -PC     Row Name 10/13/21 1412          Pain Scale: Numbers Pre/Post-Treatment    Pretreatment Pain Rating 2/10  -PC     Posttreatment Pain Rating 2/10  -PC     Pain Location - Side Right  -PC     Pain Location knee  -PC     Pain  Intervention(s) Medication (See MAR); Cold applied; Repositioned  -PC     Row Name 10/13/21 1412          Plan of Care Review    Plan of Care Reviewed With patient; daughter  -PC     Outcome Summary POD 0 R TKR, PT this visit consisted of ROM and strengthening ex, and functional mobility training consisting of gait and stair training, pt did well and was able to walk 150 ft with Rwx with supervision and go up and down 3 stairs without difficulty, pt performed ex well, pt is appropriate to go home today with assist and home health  -PC     Row Name 10/13/21 1412          Therapy Assessment/Plan (PT)    Criteria for Skilled Interventions Met (PT) yes; meets criteria  -PC     Row Name 10/13/21 1412          Positioning and Restraints    Pre-Treatment Position sitting in chair/recliner  -PC     Post Treatment Position chair  -PC     In Chair reclined; call light within reach; encouraged to call for assist; exit alarm on  -PC           User Key  (r) = Recorded By, (t) = Taken By, (c) = Cosigned By    Initials Name Provider Type    Kandice Man, PT Physical Therapist               Outcome Measures     Row Name 10/13/21 1418          How much help from another person do you currently need...    Turning from your back to your side while in flat bed without using bedrails? 4  -PC     Moving from lying on back to sitting on the side of a flat bed without bedrails? 4  -PC     Moving to and from a bed to a chair (including a wheelchair)? 4  -PC     Standing up from a chair using your arms (e.g., wheelchair, bedside chair)? 4  -PC     Climbing 3-5 steps with a railing? 3  -PC     To walk in hospital room? 3  -PC     AM-PAC 6 Clicks Score (PT) 22  -PC     Row Name 10/13/21 1418          Functional Assessment    Outcome Measure Options AM-PAC 6 Clicks Basic Mobility (PT)  -PC           User Key  (r) = Recorded By, (t) = Taken By, (c) = Cosigned By    Initials Name Provider Type    Kandice Man, PT Physical Therapist                              Physical Therapy Education                 Title: PT OT SLP Therapies (Done)     Topic: Physical Therapy (Done)     Point: Mobility training (Done)     Learning Progress Summary           Patient Acceptance, E,D, DU by  at 10/13/2021 1418                   Point: Home exercise program (Done)     Learning Progress Summary           Patient Acceptance, E,D, DU by PC at 10/13/2021 1418                   Point: Body mechanics (Done)     Learning Progress Summary           Patient Acceptance, E,D, DU by PC at 10/13/2021 1418                   Point: Precautions (Done)     Learning Progress Summary           Patient Acceptance, E,D, DU by PC at 10/13/2021 1418                               User Key     Initials Effective Dates Name Provider Type Discipline     06/16/21 -  Kandice Dave PT Physical Therapist PT              PT Recommendation and Plan     Plan of Care Reviewed With: patient, daughter  Outcome Summary: POD 0 R TKR, PT this visit consisted of ROM and strengthening ex, and functional mobility training consisting of gait and stair training, pt did well and was able to walk 150 ft with Rwx with supervision and go up and down 3 stairs without difficulty, pt performed ex well, pt is appropriate to go home today with assist and home health     Time Calculation:    PT Charges     Row Name 10/13/21 1419             Time Calculation    Start Time 1343  -PC      Stop Time 1408  -PC      Time Calculation (min) 25 min  -PC      PT Received On 10/13/21  -PC              Time Calculation- PT    Total Timed Code Minutes- PT 20 minute(s)  -PC            User Key  (r) = Recorded By, (t) = Taken By, (c) = Cosigned By    Initials Name Provider Type     Kandice Dave PT Physical Therapist              Therapy Charges for Today     Code Description Service Date Service Provider Modifiers Qty    [id number]  PT THER PROC EA 15 MIN 10/13/2021 Kandice Dave, PT GP 1    [id number] HC PT EVAL  LOW COMPLEXITY 2 10/13/2021 Kandice Dave, PT GP 1          PT G-Codes  Outcome Measure Options: AM-PAC 6 Clicks Basic Mobility (PT)  AM-PAC 6 Clicks Score (PT): 22    Kandice Dave, PT  10/13/2021

## 2021-10-13 NOTE — CASE MANAGEMENT/SOCIAL WORK
Continued Stay Note  [facility]     Patient Name: Sherri Phillips  MRN: [id number]  Today's Date: 10/13/2021    Admit Date: 10/13/2021     Discharge Plan     Row Name 10/13/21 2222       Plan    Plan Home with family support & OP PT.    Patient/Family in Agreement with Plan yes    Plan Comments [facility]. Updated the patient who's agreeable and plans to go to Outpatient Physical Therapy. Patient said she will schedule her OP PT appointments and arrange transportation to/from those appointments independently. No other needs identified.    Final Discharge Disposition Code 01 - home or self-care    Final Note Home with family support & OP PT.               Discharge Codes    No documentation.               Expected Discharge Date and Time     Expected Discharge Date Expected Discharge Time    Oct 13, 2021             Whitney Matute RN

## 2021-10-13 NOTE — DISCHARGE INSTRUCTIONS
Dr. Sid Ryan   Total Knee Replacement Discharge Instructions:  Office Phone Number: [phone]    I. ACTIVITIES:  1. Exercises:  ? Complete exercise program as taught by the hospital physical therapist 2 times per day.  You may wean off the walker to a cane when directed by the physical therapist.  ? Exercise program will be advanced by the physical therapist  ? During the day be up ambulating every 2 hours (while awake) for short distances  ? Complete the ankle pump exercises at least 10 times per hour (while awake)  ? Elevate legs most of the day the first week post operatively and thereafter elevate legs when in bed and for at least 30 minutes during the day. Use cold packs 20-30 minutes approximately 5 times per day. This should be done before and after completing your exercises and at any time you are experiencing pain/ stiffness in your operative extremity.      2. Activities of Daily Living:  ? No tub baths, hot tubs, or swimming pools for 4 weeks  ? The tan or skin colored dressing is on your knee is waterproof.  You may shower without covering the dressing beginning 3 days after the operation.  After 7 days you may remove the dressing.  If the dressing becomes saturated prior to day 7, it may be changed.  After dressing removal, do not scrub or rub the incision. Allow skin glue to fall off over the next few weeks.  After the dressing is removed, simply let the water run over the incision and pat dry.    II. Restrictions  ? Follow any movement restrictions that was discussed with you by either Dr. Ryan or the physical therapist.     ? Avoid kneeling on the knee that was operated on  ? Dr. Ryan will discuss with you when you will be able to drive again at your first post-op appointment.  ? Weight bearing is as tolerated.  ? First week stay inside on even terrain. May go up and down stairs one stair at a time utilizing the hand rail.  ? Once you feel confident, you may venture  outside.    Exercises:  · Perform quad sets as instructed by the therapist at least 3 times a day  · Perform leg hangs with you heel placed on a chair or footrest and allow you knee to stretch towards a more straightened position several times a day  · Work on knee flexion exercises at least three times daily.  · Avoid placing a pillow under your knee as this will cause your knee to become more stiff throughout the recovery process.    III. Precautions:  ? Everyone that comes near you should wash their hands  ? No elective dental, genital-urinary, or colon procedures or surgical procedures for 12 weeks after surgery unless absolutely necessary.  ?  If dental work or surgical procedure is deemed absolutely necessary within 12 weeks of surgery, you will need to contact Dr. Ryan's office as you will need to take antibiotics 1 hour prior to any dental work (including teeth cleanings).  ? Dr. Ryan will prescribe prophylactic antibiotics for all dental procedures for one year  as a precautionary measure to minimize risk of infection.  If you are a diabetic or take immunosuppressive medication, you may have to take prophylactic antibiotics the remainder of your life before dental work.    ? Avoid sick people. If you must be around someone who is ill, they should wear a mask.  ? Avoid visits to the Emergency Room or Urgent Care unless you are having a life threatening event.   ? If you have leg swelling you may wear leg compression stocking.   Stockings are to be placed on in the morning and removed at night. Monitor the stockings to ensure that any swelling is not causing the stockings to become too tight. In this case, remove stockings immediately.    IV. INCISION CARE:  ? Dr. Ryan takes great care in closing your incision to give you the best opportunity for a healthy incision with minimal scarring. He places sutures below the skin surface that will eventually dissolve.  The incision is then covered with a skin  glue which makes the incision water tight, and minimizes bleeding onto the dressing.  No staples are used.  Occasionally one of the buried stitches may come to the skin surface and may need to be removed.  Please resist the temptation of removing the stitch by yourself.   will be happy to remove it for you.  Bruising around the incision and thigh is normal and to be expected.  Please keep dressing in place at least until post-op day 7. You may remove and replace dressing before day 7 if the dressing begins to fall off or becomes saturated. Wash your hands and under your finger nails prior to dressing changes.  After day 7 as long as incision is dry and intact, you may leave the dressing off and open to air.    ? If dressing must be changed, utilize dry gauze and paper tape. Avoid touching the side of the gauze that goes against the incision with your hands.  ? No creams or ointments to the incision until permission given by Dr. Ryan.  ? Do not touch or pick at the incision, or try to remove any sutures or skin glue.  ? Check dressing every day and notify surgeon immediately if any of the following signs or symptoms are noted:  o Increase in redness  o Increase in swelling of the entire extremity that does not go away with elevation.  Notify office that you may have a blood clot.    o Drainage oozing from the incision  o Pulling apart of the edges of the incision  o Increase in overall body temperature (greater than 100.5 degrees)    V. Medications:   1. Anticoagulants: You will be discharged on an anticoagulant. This is a prophylactic medication that helps prevent blood clots during your post-operative period. The type and length of dosage varies based on your individual needs, procedure performed, and Dr. Ryan's preference.  ? While taking the anticoagulant, you should avoid taking any additional aspirin than what is prescribed.   ? Notify surgeon immediately if any shakir bleeding is noted in the  urine, stool, emesis, or from the nose or the incision. Blood in the stool will often appear as black rather than red. Blood in urine may appear as pink. Blood in emesis may appear as brown/black like coffee grounds.  ? You will need to apply pressure for longer periods of time to any cuts or abrasions to stop bleeding  ? Avoid alcohol while taking anticoagulants.    2. Stool Softeners: You will be at greater risk of constipation after surgery due to being less mobile and the pain medications.   ? Take stool softeners as instructed by your surgeon while on pain medications. Over the counter Colace 100 mg 1-2 capsules twice daily.   ? If stools become too loose or too frequent, please decreases the dosage or stop the stool softener.  ? If constipation occurs despite use of stool softeners, you are to continue the stool softeners and add a laxative (Milk of Magnesia 1 ounce daily as needed).  If no bowel movement occurs past 3 days, then purchase Magnesium citrate and drink 1/2 bottle every 8 hours (on ice tastes better) until success. If no bowel movement by post-operative day 5 please call Dr. Ryan office for further instructions.   You may need to decrease or stop your pain medications if bowel movements to not occur.     ? Drink plenty of fluids, and eat fruits and vegetables during your recovery time.    3. Pain Medications utilized after surgery are narcotics and the law requires that the following information be given to all patients that are prescribed narcotics:  ? CLASSIFICATION: Pain medications are called Opioids and are narcotics  ? LEGALITIES: It is illegal to share narcotics with others and to drive within 24 hours of taking narcotics  ? POTENTIAL SIDE EFFECTS: Potential side effects of opioids include: nausea, vomiting, itching, dizziness, drowsiness, dry mouth, constipation, and difficulty urinating.  ? POTENTIAL ADVERSE EFFECTS:   o Opioid tolerance can develop with use of pain medications and this  "simply means that it requires more and more of the medication to control pain; however, this is seen more in patients that use opioids for longer periods of time.  o Opioid dependence can develop with use of Opioids and this simply means that to stop the medication can cause withdrawal symptoms; however, this is seen with patients that use Opioids for longer periods of time.  o Opioid addiction can develop with use of Opioids and the incidence of this is very unlikely in patients who take the medications as ordered and stop the medications as instructed.  o Opioid overdose can be dangerous, but is unlikely when the medication is taken as ordered and stopped when ordered. It is important not to mix opioids with alcohol or with and type of sedative such as Benadryl as this can lead to over sedation and respiratory difficulty.  ? DOSAGE:   o Pain medications will need to be taken consistently for the first few days to decrease pain and promote adequate pain relief and participation in physical therapy.  o After the initial surgical pain begins to resolve, you may begin to decrease the pain medication. By the end of 6 weeks, you should be off of pain medications except for before physical therapy or to help with pain when attempting to fall asleep.  Pain medications will be tapered to lesser dosages as you are further from your surgical day.  No pain medications will be provided after 3 months from surgery.     o Refills will not be given by the office during evening hours, or weekends.  o To seek refills on pain medications during the post-operative period, you must call the office 48 hours in advance to request the refill. The office will then notify you when to  the prescription. DO NOT wait until you are out of the medication to request a refill.  They can not be \"called in\" to the pharmacy.      How to Wean Off Pain Medication:   As you begin to feel better, gradually wean off the narcotic pain medication " and begin to use it only for breakthrough pain.  · Gradually reduce the total number of pills you take each day.  This can be done by taking fewer pills at a time or by increasing the amount of time between each pill.    · For example, if you were taking 2 pills every 6 hours you would be taking a total of 8 pills per day.  Reduce this to 6 pills per day, then 4-5 and so on.  This can be done by taking 1 pill at a time instead of 2, or by taking the pills every 8 hours instead of every 6.    · As you begin to wean from the narcotic pain medication, begin substituting with over the counter tylenol when you are not taking the narcotic.  Limit total tylenol dosage to less than 4 grams per day.    V. FOLLOW-UP VISITS:  ? You will need to follow up in the office with Dr. Ryan at 3 weeks.  Please call [phone] if you need to confirm or reschedule your appointment time.   ? If you have any concerns or suspected complications prior to your follow up visit, please call your surgeons office. Do not wait until your appointment time if you suspect complications. These will need to be addressed in the office promptly.

## 2021-10-13 NOTE — ANESTHESIA POSTPROCEDURE EVALUATION
"Patient: Sherri Phillips    Procedure Summary     Date: 10/13/21 Room / Location: [facility] OR [address] [place] [address] MAIN OR    Anesthesia Start: 0848 Anesthesia Stop: 1100    Procedure: RIGHT TOTAL KNEE ARTHROPLASTY (Right Knee) Diagnosis:     Surgeons: Sid Ryan MD Provider: Isaiah Gorman MD    Anesthesia Type: spinal ASA Status: 3          Anesthesia Type: spinal    Vitals  Vitals Value Taken Time   /75 10/13/21 1111   Temp 36.9 °C (98.5 °F) 10/13/21 1058   Pulse 81 10/13/21 1113   Resp 14 10/13/21 1058   SpO2 93 % 10/13/21 1113   Vitals shown include unvalidated device data.        Post Anesthesia Care and Evaluation    Patient location during evaluation: bedside  Patient participation: complete - patient participated  Level of consciousness: awake and alert  Pain score: 0  Pain management: adequate  Airway patency: patent  Anesthetic complications: No anesthetic complications    Cardiovascular status: acceptable  Respiratory status: acceptable  Hydration status: acceptable    Comments: /74   Pulse 87   Temp 36.9 °C (98.5 °F) (Oral)   Resp 14   Ht 152.4 cm (60\")   Wt 72.6 kg (160 lb)   SpO2 100%   BMI 31.25 kg/m²       "

## 2021-10-15 ENCOUNTER — TELEPHONE (OUTPATIENT)
Dept: ORTHOPEDIC SURGERY | Facility: HOSPITAL | Age: 56
End: 2021-10-15

## 2021-10-15 NOTE — ADDENDUM NOTE
Addendum  created 10/15/21 0734 by Enio Hernández CRNA    Clinical Note Signed, Diagnosis association updated, Intraprocedure Blocks edited

## 2021-10-15 NOTE — TELEPHONE ENCOUNTER
Post op day 2  Discharge Instructions:  Ask patient about his or her discharge instructions  ?  Patient confirmed understanding   ?  Further instruction needed   What, if any, recommendations, teaching, or interventions did you provide? Click or tap here to enter text.  Health status:  Pain controlled Yes   States that pain is much better today. She is taking the pain medication regularly.   Recommended interventions:  No  Incision/dressing status   ?  Clean without redness, drainage, odor  ?  Redness    ?  Drainage - color Click or tap here to enter text.  ?  Odor  DARCIE - Green light blinking Choose an item.  She states she has about 3 small (pencil eraser size) spots on the dressing that has some dried blood on it. Nothing new that she can tell.   Difficulties urination No  Last BM 10/12/2021 (if no BM by day 3-recommend OTC suppository or fleets enema)  She is taking OTC stool softeners and drinking a lot of water and eating vegetables. Hoping something will happen soon.   Medications:  ?Medications reviewed with patient/family/caregiver  Patient taking medications as prescribed?   Yes  If not taking medications as prescribed, note specific medicine(s) and reason for each:  Click or tap here to enter text.  Hospital Follow Up Plan:  Follow up Appointment with Orthopedic surgeon:  ?Has f/u appointment                ?Scheduled f/u appointment  Home Care ordered at discharge?    No        Home Care started, or contact made?    No   If no, action taken: See note  DME obtained/used in home?         Yes   Other information: Ms. Phillips states she is doing so much better today. She states the pain is better and some of the swelling has subsided.  She has not started with PT yet as there wasn’t a home health agency that could come out to her daughter’s house in [facility].  She was able to get into an OP PT center but not until Monday. She states that she is walking every hour and completing her exercises at least 3  times a day. She is pleased with how well she is doing this early in the game. She has no questions/concerns for me at this time. My contact information was given should she need anything. She voiced understanding.

## 2021-10-26 ENCOUNTER — TELEPHONE (OUTPATIENT)
Dept: ORTHOPEDIC SURGERY | Facility: HOSPITAL | Age: 56
End: 2021-10-26

## 2021-10-26 NOTE — TELEPHONE ENCOUNTER
Called and spoke with Ms. Phillips as she is SP RTK, She said she is doing great. She has no complaints or issues. She is working with OP PT, she graduated to a cane today. Rode the bike for 1.3 miles in 6 minutes. Incision looks good. Pain is minimal she takes medication at night and before therapy. BM's have returned to normal. She has an appt to F/U With MD next week. She has no questions for me at this time. Ms. Phillips has my contact information should she need anything. She voiced understanding.

## 2022-02-09 ENCOUNTER — APPOINTMENT (OUTPATIENT)
Dept: PREADMISSION TESTING | Facility: HOSPITAL | Age: 57
End: 2022-02-09

## 2022-02-10 ENCOUNTER — HOSPITAL ENCOUNTER (OUTPATIENT)
Dept: GENERAL RADIOLOGY | Facility: HOSPITAL | Age: 57
Discharge: HOME | End: 2022-02-10

## 2022-02-10 ENCOUNTER — PRE-ADMISSION TESTING (OUTPATIENT)
Dept: PREADMISSION TESTING | Facility: HOSPITAL | Age: 57
End: 2022-02-10

## 2022-02-10 VITALS
OXYGEN SATURATION: 97 % | RESPIRATION RATE: 16 BRPM | HEIGHT: 60 IN | WEIGHT: 178 LBS | BODY MASS INDEX: 34.95 KG/M2 | HEART RATE: 98 BPM | TEMPERATURE: 97 F | SYSTOLIC BLOOD PRESSURE: 124 MMHG | DIASTOLIC BLOOD PRESSURE: 85 MMHG

## 2022-02-10 LAB
ALBUMIN SERPL-MCNC: 4.3 G/DL (ref 3.5–5.2)
ALBUMIN/GLOB SERPL: 1.7 G/DL
ALP SERPL-CCNC: 74 U/L (ref 39–117)
ALT SERPL W P-5'-P-CCNC: 44 U/L (ref 1–33)
ANION GAP SERPL CALCULATED.3IONS-SCNC: 11 MMOL/L (ref 5–15)
AST SERPL-CCNC: 41 U/L (ref 1–32)
BILIRUB SERPL-MCNC: 0.6 MG/DL (ref 0–1.2)
BUN SERPL-MCNC: 19 MG/DL (ref 6–20)
BUN/CREAT SERPL: 19.4 (ref 7–25)
CALCIUM SPEC-SCNC: 9.3 MG/DL (ref 8.6–10.5)
CHLORIDE SERPL-SCNC: 103 MMOL/L (ref 98–107)
CO2 SERPL-SCNC: 26 MMOL/L (ref 22–29)
CREAT SERPL-MCNC: 0.98 MG/DL (ref 0.57–1)
DEPRECATED RDW RBC AUTO: 42.8 FL (ref 37–54)
ERYTHROCYTE [DISTWIDTH] IN BLOOD BY AUTOMATED COUNT: 12.8 % (ref 12.3–15.4)
GFR SERPL CREATININE-BSD FRML MDRD: 58 ML/MIN/1.73
GLOBULIN UR ELPH-MCNC: 2.5 GM/DL
GLUCOSE SERPL-MCNC: 95 MG/DL (ref 65–99)
HBA1C MFR BLD: 5.6 % (ref 4.8–5.6)
HCT VFR BLD AUTO: 37.5 % (ref 34–46.6)
HGB BLD-MCNC: 12.7 G/DL (ref 12–15.9)
INR PPP: 0.92 (ref 0.9–1.1)
MCH RBC QN AUTO: 31.2 PG (ref 26.6–33)
MCHC RBC AUTO-ENTMCNC: 33.9 G/DL (ref 31.5–35.7)
MCV RBC AUTO: 92.1 FL (ref 79–97)
PLATELET # BLD AUTO: 295 10*3/MM3 (ref 140–450)
PMV BLD AUTO: 9.5 FL (ref 6–12)
POTASSIUM SERPL-SCNC: 4.4 MMOL/L (ref 3.5–5.2)
PROT SERPL-MCNC: 6.8 G/DL (ref 6–8.5)
PROTHROMBIN TIME: 12.3 SECONDS (ref 11.7–14.2)
RBC # BLD AUTO: 4.07 10*6/MM3 (ref 3.77–5.28)
SODIUM SERPL-SCNC: 140 MMOL/L (ref 136–145)
WBC NRBC COR # BLD: 6.72 10*3/MM3 (ref 3.4–10.8)

## 2022-02-10 PROCEDURE — 85610 PROTHROMBIN TIME: CPT

## 2022-02-10 PROCEDURE — 71046 X-RAY EXAM CHEST 2 VIEWS: CPT

## 2022-02-10 PROCEDURE — 36415 COLL VENOUS BLD VENIPUNCTURE: CPT

## 2022-02-10 PROCEDURE — 85027 COMPLETE CBC AUTOMATED: CPT

## 2022-02-10 PROCEDURE — 73562 X-RAY EXAM OF KNEE 3: CPT

## 2022-02-10 PROCEDURE — 80053 COMPREHEN METABOLIC PANEL: CPT

## 2022-02-10 PROCEDURE — 83036 HEMOGLOBIN GLYCOSYLATED A1C: CPT

## 2022-02-10 RX ORDER — MULTIPLE VITAMINS W/ MINERALS TAB 9MG-400MCG
1 TAB ORAL DAILY
COMMUNITY

## 2022-02-10 ASSESSMENT — KOOS JR
KOOS JR SCORE: 47.487
KOOS JR SCORE: 16

## 2022-02-10 NOTE — DISCHARGE INSTRUCTIONS
Take the following medications the morning of surgery:      VENLAFAXINE AND LEVOTHYROXINE    ARRIVE AT 5:15    If you are on prescription narcotic pain medication to control your pain you may also take that medication the morning of surgery.    General Instructions:  • Do not eat solid food after midnight the night before surgery.  • You may drink clear liquids day of surgery but must stop at least one hour before your hospital arrival time.  • It is beneficial for you to have a clear drink that contains carbohydrates the day of surgery.  We suggest a 12 to 20 ounce bottle of Gatorade or Powerade for non-diabetic patients or a 12 to 20 ounce bottle of G2 or Powerade Zero for diabetic patients. (Pediatric patients, are not advised to drink a 12 to 20 ounce carbohydrate drink)    Clear liquids are liquids you can see through.  Nothing red in color.     Plain water                               Sports drinks  Sodas                                   Gelatin (Jell-O)  Fruit juices without pulp such as white grape juice and apple juice  Popsicles that contain no fruit or yogurt  Tea or coffee (no cream or milk added)  Gatorade / Powerade  G2 / Powerade Zero    •   • Patients who avoid smoking, chewing tobacco and alcohol for 4 weeks prior to surgery have a reduced risk of post-operative complications.  Quit smoking as many days before surgery as you can.  • Do not smoke, use chewing tobacco or drink alcohol the day of surgery.   • If applicable bring your C-PAP/ BI-PAP machine.  • Bring any papers given to you in the doctor’s office.  • Wear clean comfortable clothes.  • Do not wear contact lenses, false eyelashes or make-up.  Bring a case for your glasses.   • Bring crutches or walker if applicable.  • Remove all piercings.  Leave jewelry and any other valuables at home.  • Hair extensions with metal clips must be removed prior to surgery.  • The Pre-Admission Testing nurse will instruct you to bring medications if  unable to obtain an accurate list in Pre-Admission Testing.          Preventing a Surgical Site Infection:  • For 2 to 3 days before surgery, avoid shaving with a razor because the razor can irritate skin and make it easier to develop an infection.    • Any areas of open skin can increase the risk of a post-operative wound infection by allowing bacteria to enter and travel throughout the body.  Notify your surgeon if you have any skin wounds / rashes even if it is not near the expected surgical site.  The area will need assessed to determine if surgery should be delayed until it is healed.  • The night prior to surgery shower using a fresh bar of anti-bacterial soap (such as Dial) and clean washcloth.  Sleep in a clean bed with clean clothing.  Do not allow pets to sleep with you.  • Shower on the morning of surgery using a fresh bar of anti-bacterial soap (such as Dial) and clean washcloth.  Dry with a clean towel and dress in clean clothing.  • Ask your surgeon if you will be receiving antibiotics prior to surgery.  • Make sure you, your family, and all healthcare providers clean their hands with soap and water or an alcohol based hand  before caring for you or your wound.    Day of surgery:  Your arrival time is approximately two hours before your scheduled surgery time.  Upon arrival, a Pre-op nurse and Anesthesiologist will review your health history, obtain vital signs, and answer questions you may have.  The only belongings needed at this time will be a list of your home medications and if applicable your C-PAP/BI-PAP machine.  A Pre-op nurse will start an IV and you may receive medication in preparation for surgery, including something to help you relax.     Please be aware that surgery does come with discomfort.  We want to make every effort to control your discomfort so please discuss any uncontrolled symptoms with your nurse.   Your doctor will most likely have prescribed pain medications.       If you are going home after surgery you will receive individualized written care instructions before being discharged.  A responsible adult must drive you to and from the hospital on the day of your surgery and stay with you for 24 hours.  Discharge prescriptions can be filled by the hospital pharmacy during regular pharmacy hours.  If you are having surgery late in the day/evening your prescription may be e-prescribed to your pharmacy.  Please verify your pharmacy hours or chose a 24 hour pharmacy to avoid not having access to your prescription because your pharmacy has closed for the day.    If you are staying overnight following surgery, you will be transported to your hospital room following the recovery period.  [facility] has all private rooms.    If you have any questions please call Pre-Admission Testing at [phone].  Deductibles and co-payments are collected on the day of service. Please be prepared to pay the required co-pay, deductible or deposit on the day of service as defined by your plan.    Patient Education for Self-Quarantine Process    • Following your COVID testing, we strongly recommend that you wear a mask when you are with other people and practice social distancing.   • Limit your activities to only required outings.  • Wash your hands with soap and water frequently for at least 20 seconds.   • Avoid touching your eyes, nose and mouth with unwashed hands.  • Do not share anything - utensils, drinking glasses, food from the same bowl.   • Sanitize household surfaces daily. Include all high touch areas (door handles, light switches, phones, countertops, etc.)    Call your surgeon immediately if you experience any of the following symptoms:  • Sore Throat  • Shortness of Breath or difficulty breathing  • Cough  • Chills  • Body soreness or muscle pain  • Headache  • Fever  • New loss of taste or smell  • Do not arrive for your surgery ill.  Your procedure will need to be  rescheduled to another time.  You will need to call your physician before the day of surgery to avoid any unnecessary exposure to hospital staff as well as other patients.    CHLORHEXIDINE CLOTH INSTRUCTIONS  The morning of surgery follow these instructions using the Chlorhexidine cloths you've been given.  These steps reduce bacteria on the body.  Do not use the cloths near your eyes, ears mouth, genitalia or on open wounds.  Throw the cloths away after use but do not try to flush them down a toilet.      • Open and remove one cloth at a time from the package.    • Leave the cloth unfolded and begin the bathing.  • Massage the skin with the cloths using gentle pressure to remove bacteria.  Do not scrub harshly.   • Follow the steps below with one 2% CHG cloth per area (6 total cloths).  • One cloth for neck, shoulders and chest.  • One cloth for both arms, hands, fingers and underarms (do underarms last).  • One cloth for the abdomen followed by groin.  • One cloth for right leg and foot including between the toes.  • One cloth for left leg and foot including between the toes.  • The last cloth is to be used for the back of the neck, back and buttocks.    Allow the CHG to air dry 3 minutes on the skin which will give it time to work and decrease the chance of irritation.  The skin may feel sticky until it is dry.  Do not rinse with water or any other liquid or you will lose the beneficial effects of the CHG.  If mild skin irritation occurs, do rinse the skin to remove the CHG.  Report this to the nurse at time of admission.  Do not apply lotions, creams, ointments, deodorants or perfumes after using the clothes. Dress in clean clothes before coming to the hospital.    BACTROBAN NASAL OINTMENT  There are many germs normally in your nose. Bactroban is an ointment that will help reduce these germs. Please follow these instructions for Bactroban use:      ____The day before surgery in the  morning  Date________    ____The day before surgery in the evening              Date________    ____The day of surgery in the morning    Date________    **Squirt ½ package of Bactroban Ointment onto a cotton applicator and apply to inside of 1st nostril.  Squirt the remaining Bactroban and apply to the inside of the other nostril.  .

## 2022-02-11 ENCOUNTER — TRANSCRIBE ORDERS (OUTPATIENT)
Dept: HOME HEALTH SERVICES | Facility: HOME HEALTHCARE | Age: 57
End: 2022-02-11

## 2022-02-11 ENCOUNTER — HOME HEALTH ADMISSION (OUTPATIENT)
Dept: HOME HEALTH SERVICES | Facility: HOME HEALTHCARE | Age: 57
End: 2022-02-11

## 2022-02-11 DIAGNOSIS — Z47.1 AFTERCARE FOLLOWING LEFT KNEE JOINT REPLACEMENT SURGERY: Primary | ICD-10-CM

## 2022-02-11 DIAGNOSIS — Z96.652 AFTERCARE FOLLOWING LEFT KNEE JOINT REPLACEMENT SURGERY: Primary | ICD-10-CM

## 2022-02-11 NOTE — PAT
Pt had RTKA on 10/13 here at  by Dr Ryan and is now returning for LTKA.  She states she did extremely well after first surgery.  No changes in medications etc.  She did develop Covid in Dec and was treated by her physician for same.  She states she has recovered well and has no symptoms currently.  Exam reveals healthy appearing middle aged female in NAD.  resp easy and unlabored.  Neuro grossly intact.  Chest CTA. S1S2 RRR. No edema noted.  States incision well healed and skin intact.    Using EKG from October.  Labs acceptable. Waiting for Xrays

## 2022-02-15 ENCOUNTER — TELEPHONE (OUTPATIENT)
Dept: ORTHOPEDIC SURGERY | Facility: HOSPITAL | Age: 57
End: 2022-02-15

## 2022-02-15 NOTE — TELEPHONE ENCOUNTER
Attempted to reach Ms. Phillips to complete the Pre Op Screening. Unable to leave message. Will try again later    2/15 @ 2036--Second attempt to reach Ms. Phillips to complete her Pre Op screening unable to leave VM.

## 2022-02-16 ENCOUNTER — TELEPHONE (OUTPATIENT)
Dept: ORTHOPEDIC SURGERY | Facility: HOSPITAL | Age: 57
End: 2022-02-16

## 2022-02-16 NOTE — TELEPHONE ENCOUNTER
Risk Factor yes no   Age >75  X   BMI <20 >40  X   Patient History     Chronic Pain (2 or more meds/Pain Management)  X   ETOH (more than 3 drinks Daily)  X   Uncontrolled Depression/Bipolar/Schizoaffective Disorder  X   Arrhythmias  X   Stent placement/MI  X   DVT/PE  X   Pacemaker  X   HTN (uncontrolled or requiring more than 2 medications)  X   CHF/Retained fluids/Edema  X   Stroke with Residual   X   COPD/Asthma  X   RUBÉN--Non-compliant with CPAP  X   Diabetes (on insulin or more than 2 meds)         A1C: 5.60  X   BPH/Urinary retention (on medication)  X   CKD  X   Home environment and support     Current ambulation status (use of cane, walker, W/C, Multiple falls/weakness)  X   Stairs to enter and throughout home X    Lives Alone  X   Doesn’t have support at home  X     Outpatient Screening Assessment    Home needs: (Walker/BSC):  Has Both  ? Steps 2  steps to enter  Caregiver 24-48hrs post-discharge: lives with daughter and granddaughter    Discharge Plan:  OP PT    Prescriptions: Meds to bed    Home medications:   ? Blood thinner/anti-coag therapy  ? BPH or diuretic--HCTZ  ? BP meds  ? Pain/Anti-inflammatories  Pre-op Education:  Educate patient on spinal anesthesia/pain control:  ? patient verbalize understanding    Educate patient on hospital course/timeline:  ?  patient verbalize understanding    Joint Care Class:  ?  yes ? no      Notes:   Had the other one in October

## 2022-02-22 ENCOUNTER — ANESTHESIA EVENT (OUTPATIENT)
Dept: PERIOP | Facility: HOSPITAL | Age: 57
End: 2022-02-22

## 2022-02-22 ENCOUNTER — LAB (OUTPATIENT)
Dept: LAB | Facility: HOSPITAL | Age: 57
End: 2022-02-22

## 2022-02-22 LAB — SARS-COV-2 ORF1AB RESP QL NAA+PROBE: NOT DETECTED

## 2022-02-22 PROCEDURE — C9803 HOPD COVID-19 SPEC COLLECT: HCPCS

## 2022-02-22 PROCEDURE — U0004 COV-19 TEST NON-CDC HGH THRU: HCPCS

## 2022-02-22 NOTE — PROGRESS NOTES
Pharmacy Note - Penicillin Allergy Review    Sherri Phillips is a 57 y.o. female who has a documented allergy to cephalexin, experienced rash as a child. Pharmacy has thoroughly reviewed the patient's allergy and past antibiotic tolerability to streamline antibiotic prophylaxis prior to hip or knee arthroplasty.    Beta-lactam antibiotic allergy and date reported in Epic: Cephalexin     Patient has tolerated the following beta-lactam antibiotics previously per Epic review (with dates): Tolerated multiple beta-lactams in past including penicillin/amoxicillin/Cefazolin (2021)       Assessment/Plan  -Sherri has tolerated cefazolin on 10/13/2021  -This lines up with her reported allergy as cefazolin has a completely different side-chain than all other beta-lactams, including cephalexin.   -Cefazolin 2g IV x1 pre-op dose has been ordered for scheduled procedure on 2/23.     Pravin Katz, PharmD, BCIDP  Clinical Infectious Diseases Pharmacy Specialist

## 2022-02-23 ENCOUNTER — ANESTHESIA (OUTPATIENT)
Dept: PERIOP | Facility: HOSPITAL | Age: 57
End: 2022-02-23

## 2022-02-23 ENCOUNTER — APPOINTMENT (OUTPATIENT)
Dept: GENERAL RADIOLOGY | Facility: HOSPITAL | Age: 57
End: 2022-02-23

## 2022-02-23 ENCOUNTER — HOSPITAL ENCOUNTER (OUTPATIENT)
Facility: HOSPITAL | Age: 57
Discharge: HOME HEALTH | End: 2022-02-23
Attending: ORTHOPAEDIC SURGERY | Admitting: ORTHOPAEDIC SURGERY

## 2022-02-23 ENCOUNTER — HOME HEALTH ADMISSION (OUTPATIENT)
Dept: HOME HEALTH SERVICES | Facility: HOME HEALTHCARE | Age: 57
End: 2022-02-23

## 2022-02-23 VITALS
SYSTOLIC BLOOD PRESSURE: 111 MMHG | OXYGEN SATURATION: 98 % | DIASTOLIC BLOOD PRESSURE: 76 MMHG | TEMPERATURE: 97.1 F | HEART RATE: 80 BPM | WEIGHT: 178.79 LBS | BODY MASS INDEX: 34.92 KG/M2 | RESPIRATION RATE: 16 BRPM

## 2022-02-23 DIAGNOSIS — Z96.652 TOTAL KNEE REPLACEMENT STATUS, LEFT: Primary | ICD-10-CM

## 2022-02-23 PROCEDURE — 25010000002 ROPIVACAINE PER 1 MG: Performed by: ORTHOPAEDIC SURGERY

## 2022-02-23 PROCEDURE — C1889 IMPLANT/INSERT DEVICE, NOC: HCPCS | Performed by: ORTHOPAEDIC SURGERY

## 2022-02-23 PROCEDURE — 97161 PT EVAL LOW COMPLEX 20 MIN: CPT

## 2022-02-23 PROCEDURE — 25010000002 FENTANYL CITRATE (PF) 50 MCG/ML SOLUTION: Performed by: NURSE ANESTHETIST, CERTIFIED REGISTERED

## 2022-02-23 PROCEDURE — 25010000002 KETOROLAC TROMETHAMINE PER 15 MG: Performed by: ORTHOPAEDIC SURGERY

## 2022-02-23 PROCEDURE — 25010000002 CLONIDINE PER 1 MG: Performed by: ORTHOPAEDIC SURGERY

## 2022-02-23 PROCEDURE — 73560 X-RAY EXAM OF KNEE 1 OR 2: CPT

## 2022-02-23 PROCEDURE — C1713 ANCHOR/SCREW BN/BN,TIS/BN: HCPCS | Performed by: ORTHOPAEDIC SURGERY

## 2022-02-23 PROCEDURE — 25010000002 MIDAZOLAM PER 1 MG: Performed by: ANESTHESIOLOGY

## 2022-02-23 PROCEDURE — 97110 THERAPEUTIC EXERCISES: CPT

## 2022-02-23 PROCEDURE — C1776 JOINT DEVICE (IMPLANTABLE): HCPCS | Performed by: ORTHOPAEDIC SURGERY

## 2022-02-23 PROCEDURE — 0 CEFAZOLIN IN DEXTROSE 2-4 GM/100ML-% SOLUTION: Performed by: ORTHOPAEDIC SURGERY

## 2022-02-23 PROCEDURE — 25010000002 PHENYLEPHRINE 10 MG/ML SOLUTION: Performed by: NURSE ANESTHETIST, CERTIFIED REGISTERED

## 2022-02-23 PROCEDURE — 25010000002 EPINEPHRINE 1 MG/ML SOLUTION 30 ML VIAL: Performed by: ORTHOPAEDIC SURGERY

## 2022-02-23 PROCEDURE — 25010000002 MIDAZOLAM PER 1 MG: Performed by: NURSE ANESTHETIST, CERTIFIED REGISTERED

## 2022-02-23 PROCEDURE — 25010000002 PROPOFOL 10 MG/ML EMULSION: Performed by: NURSE ANESTHETIST, CERTIFIED REGISTERED

## 2022-02-23 DEVICE — VIOLET ANTIBACTERIAL POLYDIOXANONE, KNOTLESS TISSUE CONTROL DEVICE
Type: IMPLANTABLE DEVICE | Site: KNEE | Status: FUNCTIONAL
Brand: STRATAFIX

## 2022-02-23 DEVICE — KNOTLESS TISSUE CONTROL DEVICE, UNDYED UNIDIRECTIONAL (ANTIBACTERIAL) SYNTHETIC ABSORBABLE DEVICE
Type: IMPLANTABLE DEVICE | Site: KNEE | Status: FUNCTIONAL
Brand: STRATAFIX

## 2022-02-23 DEVICE — TOTL KN: Type: IMPLANTABLE DEVICE | Site: KNEE | Status: FUNCTIONAL

## 2022-02-23 DEVICE — FEMUR SPHERE CEMENTED LEFT, SIZE 2+
Type: IMPLANTABLE DEVICE | Site: KNEE | Status: FUNCTIONAL
Brand: GMK SPHERE TOTAL KNEE SYSTEM

## 2022-02-23 DEVICE — FIXED TIBIAL TRAY CEMENTED LEFT, SIZE 2
Type: IMPLANTABLE DEVICE | Site: KNEE | Status: FUNCTIONAL
Brand: GMK PRIMARY TOTAL KNEE SYSTEM

## 2022-02-23 DEVICE — TIBIAL INSERT FIXED SPHERE FLEX #2/10 MM L
Type: IMPLANTABLE DEVICE | Site: KNEE | Status: FUNCTIONAL
Brand: GMK SPHERE TOTAL KNEE SYSTEM

## 2022-02-23 DEVICE — PALACOS® R IS A FAST-CURING, RADIOPAQUE, POLY(METHYL METHACRYLATE)-BASED BONE CEMENT.PALACOS ® R CONTAINS THE X-RAY CONTRAST MEDIUM ZIRCONIUM DIOXIDE. TO IMPROVE VISIBILITY IN THE SURGICAL FIELD PALACOS ® R HAS BEEN COLOURED WITH CHLOROPHYLL (E141). THE BONE CEMENT IS PREPARED DIRECTLY BEFORE USE BY MIXING A POLYMER POWDER COMPONENT WITH A LIQUID MONOMER COMPONENT. A DUCTILE DOUGH FORMS WHICH CURES WITHIN A FEW MINUTES.
Type: IMPLANTABLE DEVICE | Site: KNEE | Status: FUNCTIONAL
Brand: PALACOS®

## 2022-02-23 RX ORDER — PROPOFOL 10 MG/ML
VIAL (ML) INTRAVENOUS CONTINUOUS PRN
Status: DISCONTINUED | OUTPATIENT
Start: 2022-02-23 | End: 2022-02-23 | Stop reason: SURG

## 2022-02-23 RX ORDER — SODIUM CHLORIDE, SODIUM LACTATE, POTASSIUM CHLORIDE, CALCIUM CHLORIDE 600; 310; 30; 20 MG/100ML; MG/100ML; MG/100ML; MG/100ML
9 INJECTION, SOLUTION INTRAVENOUS CONTINUOUS
Status: DISCONTINUED | OUTPATIENT
Start: 2022-02-23 | End: 2022-02-23 | Stop reason: HOSPADM

## 2022-02-23 RX ORDER — ONDANSETRON 4 MG/1
4 TABLET, FILM COATED ORAL EVERY 8 HOURS PRN
Qty: 10 TABLET | Refills: 0 | Status: SHIPPED | OUTPATIENT
Start: 2022-02-23

## 2022-02-23 RX ORDER — TRANEXAMIC ACID 100 MG/ML
INJECTION, SOLUTION INTRAVENOUS AS NEEDED
Status: DISCONTINUED | OUTPATIENT
Start: 2022-02-23 | End: 2022-02-23 | Stop reason: SURG

## 2022-02-23 RX ORDER — HYDROCODONE BITARTRATE AND ACETAMINOPHEN 7.5; 325 MG/1; MG/1
1 TABLET ORAL EVERY 6 HOURS PRN
Qty: 50 TABLET | Refills: 0 | Status: SHIPPED | OUTPATIENT
Start: 2022-02-23

## 2022-02-23 RX ORDER — ASPIRIN 81 MG/1
81 TABLET ORAL DAILY
Qty: 30 TABLET | Refills: 0 | Status: SHIPPED | OUTPATIENT
Start: 2022-02-23 | End: 2022-03-25

## 2022-02-23 RX ORDER — ACETAMINOPHEN 10 MG/ML
1000 INJECTION, SOLUTION INTRAVENOUS ONCE
Status: DISCONTINUED | OUTPATIENT
Start: 2022-02-23 | End: 2022-02-23 | Stop reason: HOSPADM

## 2022-02-23 RX ORDER — MIDAZOLAM HYDROCHLORIDE 1 MG/ML
1 INJECTION INTRAMUSCULAR; INTRAVENOUS
Status: DISCONTINUED | OUTPATIENT
Start: 2022-02-23 | End: 2022-02-23 | Stop reason: HOSPADM

## 2022-02-23 RX ORDER — BUPIVACAINE HYDROCHLORIDE 7.5 MG/ML
INJECTION, SOLUTION EPIDURAL; RETROBULBAR
Status: COMPLETED | OUTPATIENT
Start: 2022-02-23 | End: 2022-02-23

## 2022-02-23 RX ORDER — MIDAZOLAM HYDROCHLORIDE 1 MG/ML
INJECTION INTRAMUSCULAR; INTRAVENOUS AS NEEDED
Status: DISCONTINUED | OUTPATIENT
Start: 2022-02-23 | End: 2022-02-23 | Stop reason: SURG

## 2022-02-23 RX ORDER — FAMOTIDINE 10 MG/ML
20 INJECTION, SOLUTION INTRAVENOUS ONCE
Status: COMPLETED | OUTPATIENT
Start: 2022-02-23 | End: 2022-02-23

## 2022-02-23 RX ORDER — SODIUM CHLORIDE 0.9 % (FLUSH) 0.9 %
3 SYRINGE (ML) INJECTION EVERY 12 HOURS SCHEDULED
Status: DISCONTINUED | OUTPATIENT
Start: 2022-02-23 | End: 2022-02-23 | Stop reason: HOSPADM

## 2022-02-23 RX ORDER — PHENYLEPHRINE HYDROCHLORIDE 10 MG/ML
INJECTION INTRAVENOUS AS NEEDED
Status: DISCONTINUED | OUTPATIENT
Start: 2022-02-23 | End: 2022-02-23 | Stop reason: SURG

## 2022-02-23 RX ORDER — MAGNESIUM HYDROXIDE 1200 MG/15ML
LIQUID ORAL AS NEEDED
Status: DISCONTINUED | OUTPATIENT
Start: 2022-02-23 | End: 2022-02-23 | Stop reason: HOSPADM

## 2022-02-23 RX ORDER — HYDROCODONE BITARTRATE AND ACETAMINOPHEN 7.5; 325 MG/1; MG/1
1 TABLET ORAL EVERY 4 HOURS PRN
Status: DISCONTINUED | OUTPATIENT
Start: 2022-02-23 | End: 2022-02-23 | Stop reason: HOSPADM

## 2022-02-23 RX ORDER — FENTANYL CITRATE 50 UG/ML
INJECTION, SOLUTION INTRAMUSCULAR; INTRAVENOUS AS NEEDED
Status: DISCONTINUED | OUTPATIENT
Start: 2022-02-23 | End: 2022-02-23 | Stop reason: SURG

## 2022-02-23 RX ORDER — SODIUM CHLORIDE 0.9 % (FLUSH) 0.9 %
3-10 SYRINGE (ML) INJECTION AS NEEDED
Status: DISCONTINUED | OUTPATIENT
Start: 2022-02-23 | End: 2022-02-23 | Stop reason: HOSPADM

## 2022-02-23 RX ORDER — FENTANYL CITRATE 50 UG/ML
50 INJECTION, SOLUTION INTRAMUSCULAR; INTRAVENOUS
Status: DISCONTINUED | OUTPATIENT
Start: 2022-02-23 | End: 2022-02-23 | Stop reason: HOSPADM

## 2022-02-23 RX ORDER — BUPIVACAINE HYDROCHLORIDE 7.5 MG/ML
INJECTION, SOLUTION EPIDURAL; RETROBULBAR AS NEEDED
Status: DISCONTINUED | OUTPATIENT
Start: 2022-02-23 | End: 2022-02-23 | Stop reason: SURG

## 2022-02-23 RX ORDER — CEFAZOLIN SODIUM 2 G/100ML
2 INJECTION, SOLUTION INTRAVENOUS ONCE
Status: COMPLETED | OUTPATIENT
Start: 2022-02-23 | End: 2022-02-23

## 2022-02-23 RX ORDER — KETOROLAC TROMETHAMINE 30 MG/ML
30 INJECTION, SOLUTION INTRAMUSCULAR; INTRAVENOUS ONCE
Status: COMPLETED | OUTPATIENT
Start: 2022-02-23 | End: 2022-02-23

## 2022-02-23 RX ORDER — LIDOCAINE HYDROCHLORIDE 10 MG/ML
0.5 INJECTION, SOLUTION EPIDURAL; INFILTRATION; INTRACAUDAL; PERINEURAL ONCE AS NEEDED
Status: DISCONTINUED | OUTPATIENT
Start: 2022-02-23 | End: 2022-02-23 | Stop reason: HOSPADM

## 2022-02-23 RX ADMIN — FENTANYL CITRATE 50 MCG: 0.05 INJECTION, SOLUTION INTRAMUSCULAR; INTRAVENOUS at 07:15

## 2022-02-23 RX ADMIN — FAMOTIDINE 20 MG: 10 INJECTION INTRAVENOUS at 06:32

## 2022-02-23 RX ADMIN — HYDROCODONE BITARTRATE AND ACETAMINOPHEN 1 TABLET: 7.5; 325 TABLET ORAL at 12:29

## 2022-02-23 RX ADMIN — TRANEXAMIC ACID 1000 MG: 1 INJECTION, SOLUTION INTRAVENOUS at 08:26

## 2022-02-23 RX ADMIN — MIDAZOLAM 1 MG: 1 INJECTION INTRAMUSCULAR; INTRAVENOUS at 07:02

## 2022-02-23 RX ADMIN — SODIUM CHLORIDE, POTASSIUM CHLORIDE, SODIUM LACTATE AND CALCIUM CHLORIDE: 600; 310; 30; 20 INJECTION, SOLUTION INTRAVENOUS at 08:48

## 2022-02-23 RX ADMIN — PHENYLEPHRINE HYDROCHLORIDE 100 MCG: 10 INJECTION, SOLUTION INTRAVENOUS at 08:08

## 2022-02-23 RX ADMIN — KETOROLAC TROMETHAMINE 30 MG: 30 INJECTION, SOLUTION INTRAMUSCULAR; INTRAVENOUS at 09:32

## 2022-02-23 RX ADMIN — SODIUM CHLORIDE, POTASSIUM CHLORIDE, SODIUM LACTATE AND CALCIUM CHLORIDE 9 ML/HR: 600; 310; 30; 20 INJECTION, SOLUTION INTRAVENOUS at 06:33

## 2022-02-23 RX ADMIN — CEFAZOLIN SODIUM 2 G: 2 INJECTION, SOLUTION INTRAVENOUS at 07:02

## 2022-02-23 RX ADMIN — BUPIVACAINE HYDROCHLORIDE 1.6 ML: 7.5 INJECTION, SOLUTION EPIDURAL; RETROBULBAR at 07:18

## 2022-02-23 RX ADMIN — FENTANYL CITRATE 50 MCG: 0.05 INJECTION, SOLUTION INTRAMUSCULAR; INTRAVENOUS at 07:21

## 2022-02-23 RX ADMIN — PROPOFOL 100 MCG/KG/MIN: 10 INJECTION, EMULSION INTRAVENOUS at 07:25

## 2022-02-23 RX ADMIN — MIDAZOLAM 2 MG: 1 INJECTION INTRAMUSCULAR; INTRAVENOUS at 07:26

## 2022-02-23 RX ADMIN — BUPIVACAINE HYDROCHLORIDE 1.6 ML: 7.5 INJECTION, SOLUTION EPIDURAL; RETROBULBAR at 07:22

## 2022-02-23 RX ADMIN — TRANEXAMIC ACID 1000 MG: 1 INJECTION, SOLUTION INTRAVENOUS at 07:26

## 2022-02-23 NOTE — ANESTHESIA PROCEDURE NOTES
Spinal Block    Pre-sedation assessment completed: 2/23/2022 7:18 AM    Patient reassessed immediately prior to procedure    Patient location during procedure: OR  Start Time: 2/23/2022 7:18 AM  Stop Time: 2/23/2022 7:22 AM  Indication:at surgeon's request  Performed By  Anesthesiologist: Thanh Martínez MD  Preanesthetic Checklist  Completed: patient identified, IV checked, risks and benefits discussed, surgical consent, monitors and equipment checked, pre-op evaluation and timeout performed  Spinal Block Prep:  Patient Position:sitting  Sterile Tech:cap, gloves, gown, mask and sterile barriers  Prep:Betadine  Patient Monitoring:blood pressure monitoring, continuous pulse oximetry and EKG  Spinal Block Procedure  Approach:midline  Guidance:palpation technique  Location:L4-L5  Needle Type:Sprotte  Needle Gauge:24 G  Placement of Spinal needle event:cerebrospinal fluid aspirated  Paresthesia: no  Fluid Appearance:clear  Medications: bupivacaine PF (MARCAINE) 0.75 % injection, 1.6 mL   Post Assessment  Patient Tolerance:patient tolerated the procedure well with no apparent complications  Complications no

## 2022-02-23 NOTE — OP NOTE
Sid Ryan MD  Left TOTAL KNEE ARTHROPLASTY  Procedure Note    Sherri Phillips  2/23/2022    Pre-op Diagnosis: Osteoarthritis left knee primary generalized  Post-op Diagnosis:Same  Procedure: Left total Knee Arthroplasty cpt 86196  Surgeon:  Sid Ryan MD  Assistant:    Thanh Garcia NP  The services of a first assist were necessary for performing the procedure safely and expeditiously.  The first assist was present for the entire duration of the case and helped with positioning, retraction and closure of the incision.      Nani Milian [facility] student  Anesthesia: Spinal, Anesthesiologist: Thanh Martínez MD  CRNA: Sharon Dickerson CRNA  Staff: Circulator: Karo Murillo RN; Cheryl Zeng RN  Scrub Person: Altagracia Yeung  Vendor Representative: Fermin Fried  Assistant: Thanh Garcia APRN [facility]  Estimated Blood Loss: 25 ml  Specimens: * No orders in the log *  Drains: none  Complications: None    Components Utilized:   Implant Name Type Inv. Item Serial No.  Lot No. LRB No. Used Action   CMT BONE PALACOS R HI/VISC 1X40 - [id number] Implant CMT BONE PALACOS R HI/VISC 1X40  [facility] [id number] Left 1 Implanted   CMT BONE PALACOS R HI/VISC 1X40 - [id number] Implant CMT BONE PALACOS R HI/VISC 1X40  [facility] [id number] Left 1 Implanted   DEV CONTRL TISS STRATAFIX SPIRAL MNCRYL UD 3/0 PLS 30CM - [id number] Implant DEV CONTRL TISS STRATAFIX SPIRAL MNCRYL UD 3/0 PLS 30CM  [employer]  DIV OF J AND J [id number] Left 1 Implanted   DEV CONTRL TISS STRATAFIX SYMM PDS PLUS SHERRIE CT-1 60CM - [id number] Implant DEV CONTRL TISS STRATAFIX SYMM PDS PLUS SHERRIE CT-1 60CM  [employer]  DIV OF J AND J [id number] Left 1 Implanted   COMP FEM/KN GMKSPHERE CMT COCR SZ2PLS LT - [id number] Implant COMP FEM/KN GMKSPHERE CMT COCR SZ2PLS LT  [employer] [facility] [id number] Left 1 Implanted   TRY TIB/KN GMK SPH FIX COCR SZ2 LT - [id number] Implant TRY TIB/KN GMK SPH FIX COCR SZ2 LT  [employer] [id number] Left 1  Implanted   INSRT TIB/KN GMKSPHERE FLEX UHMWPE SZ2 10MM LT - [id number] Implant INSRT TIB/KN GMKSPHERE FLEX UHMWPE SZ2 10MM LT  [facility] [id number] Left 1 Implanted         Indication for Procedure:   The patient is a 57 y.o. female presents today for a total knee arthroplasty procedure because of failure to conservatively manage the patient's pain for arthritis.  The patient was educated in risks of surgery that could include possible risk of infection, deep venous thrombosis, pulmonary embolism, fracture, neurovascular injury, leg length discrepancy, dislocation, possible persistent pain, need for additional surgeries, anesthetic risks, medical risks including heart attack and stroke, and death.  The discussion occurred in the office pre-operatively, and patient had the opportunity to ask questions, and concerns about the proposed surgery.  The patient also understood that medicine is not an exact science, and that outcomes of the surgical procedure may be less than desired. The patient wished to proceed.      Protocols for intravenous antibiotics and venous thrombosis were followed for this patient.  IV antibiotics were infused prior to surgery and will be discontinued within 24 hours of completion of the surgical procedure.  Thrombosis prophylaxis will be initiated within 24 hours of the completion of the surgical procedure.      Procedure:   After the patient was identified in the preoperative area, and the surgical site confirmed and marked, the patient was brought to the operating room on a stretcher.  They were placed supine on the operating room table and the above anesthetic was placed uneventfully.  A time-out procedure was performed.  The intravenous antibiotics infusion was completed.  A non-sterile tourniquet was placed on the operative thigh, and was prepped and draped in the usual sterile fashion.  An Esmarch was to exsanguinate the limb, and the tourniquet was inflated.     A 10 blade scalpel was  used to make a longitudinal incision from above the patella to medial to the tibial tubercle.  A mid vastus arthrotomy was performed with another 10 blade scalpel.  The fat pat was preserved and used as a medial retractor.  The medial joint line was elevated subperiosteally with electrocautery and a Medrano elevator.   The patella was everted and a lateral denervation was performed as well as a lateral facet ostectomy.  The knee was then flexed and Ismael's line was drawn on the distal femur with electrocautery.  Then the drill was placed into the distal femur into the medullary canal.  The cartilage was removed from the medial femoral condyle.  The intramedullary distal femoral valgus cutting guide set to 5 degrees of femoral valgus was then placed into the medullary canal.  It was then pinned and the oscillating saw was used to make the osteotomy.    Then the extramedullary cutting guide was placed aligned with the tibial eminence superiorly and the tibial shaft distally, and the cut was aligned for a neutral cut along the mechanical axis.  The oscillating saw was then used to complete the osteotomy cuts.     The 4-in-1 distal femoral cutting guide was then placed after a sizer was applied to the distal femur in 3° of external rotation.  The 4-in-1 cutting guide was placed the knee was examined flexion gaps were felt to be nearly equal medially and laterally.  The 4-in-1 cutting block was then used to make the femoral osteotomy.   A laminar  was then placed medially and then laterally to remove the medial and lateral meniscus and the posterior osteophytes.  At this point the posterior cruciate ligament was removed.  The posterior medial and posterior lateral capsule was preserved.  A spacer block was placed and the knee at flexion and the knee was balanced in flexion for a medial pivot.  The knee was then placed in extension and confirmed to be balanced as well.  At this point the femoral trial was applied  the tibial baseplate was inserted and the tibia was floated to assess rotation.  The knee was examined again was stable throughout range of motion.   The lug holes were drilled in the distal femur.  The tibia was drilled and broached in the typical fashion.  The trial components were then removed and the final implants were confirmed and opened.  The bone surfaces were then irrigated and dried.  Periarticular injection was placed in the posterior capsule Palacos cement was then mixed and placed on the cut bone surfaces and back side of the implants.  The implants were then impacted into place, and the trial polyethylene spacer was placed and the knee was placed into extension.  A stack of towels was placed under the ankle and the cement was allowed to harden.  Once the cement had cured the knee was again ranged and confirmed to be balanced and have excellent range of motion.  The definitive tibial insert was placed. The tourniquet was then dropped.  Hemostasis was obtained.  The wound was then irrigated with the pulse lavage irrigation system with a 3 liter mixture of 0.35% Betadine and normal saline.  The local mixture was injected throughout the knee for postoperative pain control.   The arthrotomy was then closed using 0 Vicryl suture in running fashion for the mid vastus approach.  #2 Ethibond sutures were used as stay sutures to close the arthrotomy followed by #1 Stratafix PDS.  The deep dermis was closed with 2-0 Vicryl.   A 3-0 running subcuticular Monocryl stratofix suture was used followed by Dermabond to seal the skin.  . Sterile silver impregnated occlusive dressings were applied.   Sponge and needle counts were completed and were correct.  The patient was awakened from anesthetic and was returned to recovery in stable condition.    Aspirin 81 mg twice daily for postoperative DVT prophylaxis  Weightbearing as tolerated to the left lower extremity plan for discharge to home today    Sid Ryan  MD  Orthopaedic Surgeon    [facility]  [phone]          Date: 2/23/2022  Time: 08:35 EST

## 2022-02-23 NOTE — CASE MANAGEMENT/SOCIAL WORK
Discharge Planning Assessment  [facility]     Patient Name: Sherri Phillips  MRN: [id number]  Today's Date: 2/23/2022    Admit Date: 2/23/2022     Discharge Needs Assessment     Row Name 02/23/22 1059       Living Environment    Lives With child(janet), adult    Name(s) of Who Lives With Patient Daughter/Elizabeth.    Current Living Arrangements home/apartment/condo    Primary Care Provided by self    Provides Primary Care For no one    Family Caregiver if Needed child(janet), adult    Quality of Family Relationships helpful; involved; supportive    Able to Return to Prior Arrangements yes       Resource/Environmental Concerns    Transportation Concerns car, none       Transition Planning    Patient/Family Anticipates Transition to home with family    Patient/Family Anticipated Services at Transition     Transportation Anticipated family or friend will provide       Discharge Needs Assessment    Readmission Within the Last 30 Days no previous admission in last 30 days    Equipment Currently Used at Home none    Discharge Facility/Level of Care Needs outpatient therapy    Provided Post Acute Provider List? N/A    N/A Provider List Comment Declines.               Discharge Plan     Row Name 02/23/22 1059       Plan    Plan Home with family support & OP PT.    Patient/Family in Agreement with Plan yes    Plan Comments Spoke with the patient, verified current information and explained the role of the CCP. Patient said she lives with her daughter/Elizabeth and has family support. She's IADL and has no history with DME/RH/HH. Careplan received from Dr. Ryan's Office which plans for the patient to d/c home with [facility]. Spoke with [facility] who said they do not serve the pt's area of [place]. Discussed with the annetn who said she plans to go to Outpatient Physical Therapy. Her first appointment is at [address] OP PT tomorrow at 1100. She denies needs for HH. She also said her daughter/Elizabeth will transport her  home at d/c. No other needs identified. CCP will follow.              Continued Care and Services - Admitted Since 2/23/2022    Coordination has not been started for this encounter.       Expected Discharge Date and Time     Expected Discharge Date Expected Discharge Time    Feb 23, 2022          Demographic Summary     Row Name 02/23/22 1057       General Information    Admission Type same day    Reason for Consult discharge planning    Preferred Language English     Used During This Interaction no       Contact Information    Permission Granted to Share Info With ; family/designee               Functional Status     Row Name 02/23/22 1058       Functional Status    Usual Activity Tolerance good       Functional Status, IADL    Medications independent    Meal Preparation independent    Housekeeping independent    Laundry independent    Shopping independent       Mental Status Summary    Recent Changes in Mental Status/Cognitive Functioning no changes               Psychosocial     Row Name 02/23/22 1058       Intellectual Performance WDL    Level of Consciousness Alert       Coping/Stress    Patient Personal Strengths able to adapt    Sources of Support adult child(janet)    Reaction to Health Status accepting    Understanding of Condition and Treatment adequate understanding of medical condition; adequate understanding of treatment       Developmental Stage (Eriksson's)    Developmental Stage Stage 7 (35-65 years/Middle Adulthood) Generativity vs. Stagnation               Abuse/Neglect    No documentation.                Legal    No documentation.                Substance Abuse    No documentation.                Patient Forms    No documentation.                   Whitney Matute, RN

## 2022-02-23 NOTE — PLAN OF CARE
Goal Outcome Evaluation:  Plan of Care Reviewed With: patient           Outcome Summary: Pt. is currently independent/SBA with functional mobility and has no further questions/concerns regarding functional mobility or home safety.  Encouraged pt. to continue ther. ex. program 2-3 x's daily and to ambulate every 1-2 hours once home. Plan for discharge home this date.  Will sign off.    Patient was wearing a face mask during this therapy encounter. Therapist used appropriate personal protective equipment including eye protection, mask, and gloves.  Mask used was standard procedure mask. Appropriate PPE was worn during the entire therapy session. Hand hygiene was completed before and after therapy session. Patient is not in enhanced droplet precautions.

## 2022-02-23 NOTE — ANESTHESIA POSTPROCEDURE EVALUATION
Patient: Sherri Phillips    Procedure Summary     Date: 02/23/22 Room / Location: [facility] OR  / [facility] MAIN OR    Anesthesia Start: 0707 Anesthesia Stop: 0856    Procedure: TOTAL KNEE ARTHROPLASTY (Left Knee) Diagnosis:     Surgeons: Sid Ryan MD Provider: Thanh Martínez MD    Anesthesia Type: spinal ASA Status: 3          Anesthesia Type: spinal    Vitals  Vitals Value Taken Time   /80 02/23/22 1001   Temp 36.4 °C (97.6 °F) 02/23/22 0853   Pulse 79 02/23/22 1008   Resp 14 02/23/22 0945   SpO2 95 % 02/23/22 1008   Vitals shown include unvalidated device data.        Post Anesthesia Care and Evaluation    Patient location during evaluation: PACU  Patient participation: complete - patient participated  Level of consciousness: awake and alert  Pain management: adequate  Airway patency: patent  Anesthetic complications: No anesthetic complications    Cardiovascular status: acceptable  Respiratory status: acceptable  Hydration status: acceptable    Comments: --------------------            02/23/22               0945     --------------------   BP:       131/82     Pulse:      80       Resp:       14       Temp:                SpO2:      96%      --------------------

## 2022-02-23 NOTE — H&P
ORTHOPEDIC SURGERY PRE-OP HISTORY AND PHYSICAL      Patient: Sherri Phillips  Date of Admission: 2/23/2022  5:05 AM  YOB: 1965  Medical Record Number: [id number]  Attending Physician: Sid Ryan MD  Consulting Physician: Sid Ryan MD    CHIEF COMPLAINT: Left Knee Pain.    HISTORY OF PRESENT ILLINESS: Patient is a 57 y.o. female presents to [facility] [place] [facility] with above complaints.  The patient failed conservative treatment and patient requested surgical intervention.  The patient presents for a  Left total knee.    ALLERGIES:   Allergies   Allergen Reactions   • Keflex [Cephalexin] Rash     Tolerated multiple beta-lactams since initial reaction to cephalexin as child including: Penicillins, Amoxicillin.          HOME MEDICATIONS:  Medications Prior to Admission   Medication Sig Dispense Refill Last Dose   • Chlorhexidine Gluconate 2 % pads Apply  topically. As directed pre op   2/23/2022 at 0400   • levothyroxine (SYNTHROID, LEVOTHROID) 100 MCG tablet Take 100 mcg by mouth Every Morning.   2/23/2022 at 0400   • mupirocin (BACTROBAN) 2 % nasal ointment into the nostril(s) as directed by provider. As directed pre op   2/23/2022 at 0400   • venlafaxine XR (EFFEXOR-XR) 150 MG 24 hr capsule Take 150 mg by mouth Every Morning.   2/23/2022 at 0400   • vitamin D3 (Vitamin D) 125 MCG (5000 UT) capsule capsule Take 5,000 Units by mouth Daily.   2/22/2022 at 0800   • atorvastatin (LIPITOR) 20 MG tablet Take 20 mg by mouth Every Night.   2/18/2022 at 2000   • diclofenac (VOLTAREN) 75 MG EC tablet Take 75 mg by mouth 2 (Two) Times a Day. TO HOLD 5 DAYS BEFORE SURGERY AS INSTRUCTED BY MD   2/17/2022 at 2000   • hydroCHLOROthiazide (HYDRODIURIL) 12.5 MG tablet Take 12.5 mg by mouth Every Morning.   2/21/2022 at 0800   • multivitamin with minerals (MULTIVITAMIN ADULT PO) Take 1 tablet by mouth Daily. HOLD 5 DAYS BEFORE SURGERY AS INSTRUCTED BY MD   2/17/2022 at 0800       Past Medical History:    Diagnosis Date   • Anxiety    • Arthritis    • Clinical diagnosis of COVID-19 2021    HEADACHE,  FATIGUE  AND FEVER WITH BODY ACHES.  TESTED AT PCP AND HAD INFUSION   • Disease of thyroid gland    • Diverticular disease    • Hemorrhoid    • Hyperlipidemia    • Knee pain, right    • Left knee pain      Past Surgical History:   Procedure Laterality Date   • HYSTERECTOMY     • KNEE ARTHROSCOPY     • TOTAL KNEE ARTHROPLASTY Right 10/13/2021    Procedure: RIGHT TOTAL KNEE ARTHROPLASTY;  Surgeon: Sid Ryan MD;  Location: [facility];  Service: Orthopedics;  Laterality: Right;   • TUBAL COAGULATION LAPAROSCOPIC       Social History     Occupational History   • Not on file   Tobacco Use   • Smoking status: Former Smoker     Years: 40.00     Types: Cigarettes     Quit date: 2022     Years since quittin.1   • Smokeless tobacco: Never Used   • Tobacco comment: quit first of the year   Vaping Use   • Vaping Use: Never used   Substance and Sexual Activity   • Alcohol use: Yes     Comment: 1-2 drinks yearly  // caffeine   • Drug use: Never   • Sexual activity: Defer      Social History     Social History Narrative   • Not on file     Family History   Problem Relation Age of Onset   • Malig Hyperthermia Neg Hx        REVIEW OF SYSTEMS:    HEENT: Patient denies any headaches, vision changes, change in hearing, or tinnitus, Patient denies epistaxis, sinus pain, hoarseness, or dysphagia   Pulmonary: Patient denies any cough, congestion, acute change in SOA or wheezing.   Cardiovascular: Patient denies any change in chest pain, dyspnea, palpitations, weakness, intolerance of exercise, varicosities, change in murmur   Gastrointestinal:  Patient denies change in appetite, melena, change in bowel habits.   Genital/Urinary: Patient denies dysuria, change in color of urine, change in frequency of urination, pain with urgency, change in incontinence, retention.   Musculoskeletal: Patient denies complaints of acute  changes in symptoms of other joints not mentioned above.   Neurological: Patient denies changes in dizziness, tremor, ataxia, or difficulty in speaking or changes in memory.   Endocrine system: Patient denies acute changes in tremors, palpitations, polyuria, polydipsia, polyphagia, diaphoresis, exophthalmos, or goiter.   Psychological: Patient denies thoughts/plans of harming self or other; denies acute changes in depression,  insomnia, night terrors, gayle, disorientation.   Skin: Patient denies any bruising, rashes, discoloration, pruritus,or wounds not mentioned in history of present illness or chief complaint above.   Hematopoietic: Patient denies current bleeding, epistaxis, hematuria, or melena.    PHYSICAL EXAM:   Vitals:  Vitals:    02/23/22 0557   BP: 116/77   BP Location: Right arm   Patient Position: Lying   Pulse: 78   Resp: 18   Temp: 97.5 °F (36.4 °C)   TempSrc: Oral   SpO2: 97%   Weight: 81.1 kg (178 lb 12.7 oz)       General:  57 y.o. female who appears about stated age.    Alert, cooperative, in no acute distress                       Head:    Normocephalic, without obvious abnormality, atraumatic   Eyes:            Lids and lashes normal, conjunctivae and sclerae normal, no         icterus, no pallor, corneas clear, PERRLA   Ears:    Ears appear intact with no abnormalities noted   Throat:   No oral lesions, no thrush, oral mucosa moist   Neck:   No adenopathy, supple, trachea midline, no JVD   Back:     Limited exam shows no severe kyphosis present,no visible           erythema, no excessive  tenderness to palpation.    Lungs:     Respirations regular, even and unlabored.     Heart:    Normal rate, Pulses palpable   Chest Wall:    No abnormalities observed.   Abdomen:     Normal bowel sounds, no masses, no organomegaly, soft              non-tender, non-distended, no guarding, no rebound                      tenderness   Rectal:     Deferred   Pulses:   Pulses palpable and equal bilaterally    Skin:   No bleeding, bruising or rash   Lymph nodes:   No palpable adenopathy   Extremities:     Left Knee: Skin intact.  Painful ROM.  NVI distally.      DIAGNOSTIC TEST:  Lab on 02/22/2022   Component Date Value Ref Range Status   • COVID19 02/22/2022 Not Detected  Not Detected - Ref. Range Final       No results found.      ASSESSMENT:  Left Knee Osteoarthritis  There is no problem list on file for this patient.      PLAN:    Left TKA.    Risks and benefits of surgical intervention were discussed in detail with the patient.  Risks of infection, fracture, dislocation, extremity length discrepancy, neurovascular injury, persistent pain, medical risks, anesthetic risk, need for additional surgery, deep venous thrombosis, pulmonary embolism and death.      The above diagnosis and treatment plan was discussed with the patient and/or family.  They were educated in both non-surgical and surgical treatment options for their condition.   They were given the opportunity to ask questions and were answered to their satisfaction.  They agreed to proceed with the above treatment plan.      Plan will be for discharge to home today    Sid Ryan MD  Date: 2/23/2022

## 2022-02-23 NOTE — ANESTHESIA PREPROCEDURE EVALUATION
Anesthesia Evaluation     Patient summary reviewed and Nursing notes reviewed                Airway   Mallampati: II  TM distance: >3 FB  Neck ROM: full  Dental    (+) upper dentures    Pulmonary    (+) a smoker Current,   Cardiovascular     ECG reviewed  Rhythm: regular  Rate: normal    (+) hyperlipidemia,       Neuro/Psych- negative ROS  GI/Hepatic/Renal/Endo    (+) morbid obesity,  thyroid problem     Musculoskeletal     Abdominal    Substance History - negative use     OB/GYN negative ob/gyn ROS         Other   arthritis,                    Anesthesia Plan    ASA 3     spinal   (Patient has removed her full upper dentures prior to today's surgery    I have reviewed the patient's history with the patient and the chart, including all pertinent laboratory results and imaging. I have explained the risks of anesthesia including but not limited to dental damage, corneal abrasion, nerve injury, MI, stroke, and death. Questions asked and answered. Anesthetic plan discussed with patient and team as indicated. Patient expressed understanding of the above.  )    Anesthetic plan, all risks, benefits, and alternatives have been provided, discussed and informed consent has been obtained with: patient.        CODE STATUS:

## 2022-02-23 NOTE — DISCHARGE INSTRUCTIONS
Dr. Sid Ryan   Total Knee Replacement Discharge Instructions:  Office Phone Number: [phone]    I. ACTIVITIES:  1. Exercises:  ? Complete exercise program as taught by the hospital physical therapist 2 times per day.  You may wean off the walker to a cane when directed by the physical therapist.  ? Exercise program will be advanced by the physical therapist  ? During the day be up ambulating every 2 hours (while awake) for short distances  ? Complete the ankle pump exercises at least 10 times per hour (while awake)  ? Elevate legs most of the day the first week post operatively and thereafter elevate legs when in bed and for at least 30 minutes during the day. Use cold packs 20-30 minutes approximately 5 times per day. This should be done before and after completing your exercises and at any time you are experiencing pain/ stiffness in your operative extremity.      2. Activities of Daily Living:  ? No tub baths, hot tubs, or swimming pools for 4 weeks  ? The tan or skin colored dressing is on your knee is waterproof.  You may shower without covering the dressing beginning 3 days after the operation.  After 7 days you may remove the dressing.  If the dressing becomes saturated prior to day 7, it may be changed.  After dressing removal, do not scrub or rub the incision. Allow skin glue to fall off over the next few weeks.  After the dressing is removed, simply let the water run over the incision and pat dry.    II. Restrictions  ? Follow any movement restrictions that was discussed with you by either Dr. Ryan or the physical therapist.     ? Avoid kneeling on the knee that was operated on  ? Dr. Ryan will discuss with you when you will be able to drive again at your first post-op appointment.  ? Weight bearing is as tolerated.  ? First week stay inside on even terrain. May go up and down stairs one stair at a time utilizing the hand rail.  ? Once you feel confident, you may venture  outside.    Exercises:  · Perform quad sets as instructed by the therapist at least 3 times a day  · Perform leg hangs with you heel placed on a chair or footrest and allow you knee to stretch towards a more straightened position several times a day  · Work on knee flexion exercises at least three times daily.  · Avoid placing a pillow under your knee as this will cause your knee to become more stiff throughout the recovery process.    III. Precautions:  ? Everyone that comes near you should wash their hands  ? No elective dental, genital-urinary, or colon procedures or surgical procedures for 12 weeks after surgery unless absolutely necessary.  ?  If dental work or surgical procedure is deemed absolutely necessary within 12 weeks of surgery, you will need to contact Dr. Ryan's office as you will need to take antibiotics 1 hour prior to any dental work (including teeth cleanings).  ? Dr. Ryan will prescribe prophylactic antibiotics for all dental procedures for one year  as a precautionary measure to minimize risk of infection.  If you are a diabetic or take immunosuppressive medication, you may have to take prophylactic antibiotics the remainder of your life before dental work.    ? Avoid sick people. If you must be around someone who is ill, they should wear a mask.  ? Avoid visits to the Emergency Room or Urgent Care unless you are having a life threatening event.   ? If you have leg swelling you may wear leg compression stocking.   Stockings are to be placed on in the morning and removed at night. Monitor the stockings to ensure that any swelling is not causing the stockings to become too tight. In this case, remove stockings immediately.    IV. INCISION CARE:  ? Dr. Ryan takes great care in closing your incision to give you the best opportunity for a healthy incision with minimal scarring. He places sutures below the skin surface that will eventually dissolve.  The incision is then covered with a skin  glue which makes the incision water tight, and minimizes bleeding onto the dressing.  No staples are used.  Occasionally one of the buried stitches may come to the skin surface and may need to be removed.  Please resist the temptation of removing the stitch by yourself.   will be happy to remove it for you.  Bruising around the incision and thigh is normal and to be expected.  Please keep dressing in place at least until post-op day 7. You may remove and replace dressing before day 7 if the dressing begins to fall off or becomes saturated. Wash your hands and under your finger nails prior to dressing changes.  After day 7 as long as incision is dry and intact, you may leave the dressing off and open to air.    ? If dressing must be changed, utilize dry gauze and paper tape. Avoid touching the side of the gauze that goes against the incision with your hands.  ? No creams or ointments to the incision until permission given by Dr. Ryan.  ? Do not touch or pick at the incision, or try to remove any sutures or skin glue.  ? Check dressing every day and notify surgeon immediately if any of the following signs or symptoms are noted:  o Increase in redness  o Increase in swelling of the entire extremity that does not go away with elevation.  Notify office that you may have a blood clot.    o Drainage oozing from the incision  o Pulling apart of the edges of the incision  o Increase in overall body temperature (greater than 100.5 degrees)    V. Medications:   1. Anticoagulants: You will be discharged on an anticoagulant. This is a prophylactic medication that helps prevent blood clots during your post-operative period. The type and length of dosage varies based on your individual needs, procedure performed, and Dr. Ryan's preference.  ? While taking the anticoagulant, you should avoid taking any additional aspirin than what is prescribed.   ? Notify surgeon immediately if any shakir bleeding is noted in the  urine, stool, emesis, or from the nose or the incision. Blood in the stool will often appear as black rather than red. Blood in urine may appear as pink. Blood in emesis may appear as brown/black like coffee grounds.  ? You will need to apply pressure for longer periods of time to any cuts or abrasions to stop bleeding  ? Avoid alcohol while taking anticoagulants.    2. Stool Softeners: You will be at greater risk of constipation after surgery due to being less mobile and the pain medications.   ? Take stool softeners as instructed by your surgeon while on pain medications. Over the counter Colace 100 mg 1-2 capsules twice daily.   ? If stools become too loose or too frequent, please decreases the dosage or stop the stool softener.  ? If constipation occurs despite use of stool softeners, you are to continue the stool softeners and add a laxative (Milk of Magnesia 1 ounce daily as needed).  If no bowel movement occurs past 3 days, then purchase Magnesium citrate and drink 1/2 bottle every 8 hours (on ice tastes better) until success. If no bowel movement by post-operative day 5 please call Dr. Ryan office for further instructions.   You may need to decrease or stop your pain medications if bowel movements to not occur.     ? Drink plenty of fluids, and eat fruits and vegetables during your recovery time.    3. Pain Medications utilized after surgery are narcotics and the law requires that the following information be given to all patients that are prescribed narcotics:  ? CLASSIFICATION: Pain medications are called Opioids and are narcotics  ? LEGALITIES: It is illegal to share narcotics with others and to drive within 24 hours of taking narcotics  ? POTENTIAL SIDE EFFECTS: Potential side effects of opioids include: nausea, vomiting, itching, dizziness, drowsiness, dry mouth, constipation, and difficulty urinating.  ? POTENTIAL ADVERSE EFFECTS:   o Opioid tolerance can develop with use of pain medications and this  "simply means that it requires more and more of the medication to control pain; however, this is seen more in patients that use opioids for longer periods of time.  o Opioid dependence can develop with use of Opioids and this simply means that to stop the medication can cause withdrawal symptoms; however, this is seen with patients that use Opioids for longer periods of time.  o Opioid addiction can develop with use of Opioids and the incidence of this is very unlikely in patients who take the medications as ordered and stop the medications as instructed.  o Opioid overdose can be dangerous, but is unlikely when the medication is taken as ordered and stopped when ordered. It is important not to mix opioids with alcohol or with and type of sedative such as Benadryl as this can lead to over sedation and respiratory difficulty.  ? DOSAGE:   o Pain medications will need to be taken consistently for the first few days to decrease pain and promote adequate pain relief and participation in physical therapy.  o After the initial surgical pain begins to resolve, you may begin to decrease the pain medication. By the end of 6 weeks, you should be off of pain medications except for before physical therapy or to help with pain when attempting to fall asleep.  Pain medications will be tapered to lesser dosages as you are further from your surgical day.  No pain medications will be provided after 3 months from surgery.     o Refills will not be given by the office during evening hours, or weekends.  o To seek refills on pain medications during the post-operative period, you must call the office 48 hours in advance to request the refill. The office will then notify you when to  the prescription. DO NOT wait until you are out of the medication to request a refill.  They can not be \"called in\" to the pharmacy.      How to Wean Off Pain Medication:   As you begin to feel better, gradually wean off the narcotic pain medication " and begin to use it only for breakthrough pain.  · Gradually reduce the total number of pills you take each day.  This can be done by taking fewer pills at a time or by increasing the amount of time between each pill.    · For example, if you were taking 2 pills every 6 hours you would be taking a total of 8 pills per day.  Reduce this to 6 pills per day, then 4-5 and so on.  This can be done by taking 1 pill at a time instead of 2, or by taking the pills every 8 hours instead of every 6.    · As you begin to wean from the narcotic pain medication, begin substituting with over the counter tylenol when you are not taking the narcotic.  Limit total tylenol dosage to less than 4 grams per day.    V. FOLLOW-UP VISITS:  ? You will need to follow up in the office with Dr. Ryan at 3 weeks.  Please call [phone] if you need to confirm or reschedule your appointment time.   ? If you have any concerns or suspected complications prior to your follow up visit, please call your surgeons office. Do not wait until your appointment time if you suspect complications. These will need to be addressed in the office promptly.

## 2022-02-23 NOTE — THERAPY DISCHARGE NOTE
Patient Name: Sherri Phillips  : 1965    MRN: [id number]                              Today's Date: 2022       Admit Date: 2022    Visit Dx:     ICD-10-CM ICD-9-CM   1. Total knee replacement status, left  Z96.652 V43.65     There is no problem list on file for this patient.    Past Medical History:   Diagnosis Date   • Anxiety    • Arthritis    • Clinical diagnosis of COVID-19 2021    HEADACHE,  FATIGUE  AND FEVER WITH BODY ACHES.  TESTED AT PCP AND HAD INFUSION   • Disease of thyroid gland    • Diverticular disease    • Hemorrhoid    • Hyperlipidemia    • Knee pain, right    • Left knee pain      Past Surgical History:   Procedure Laterality Date   • HYSTERECTOMY     • KNEE ARTHROSCOPY     • TOTAL KNEE ARTHROPLASTY Right 10/13/2021    Procedure: RIGHT TOTAL KNEE ARTHROPLASTY;  Surgeon: Sid Ryan MD;  Location: [facility];  Service: Orthopedics;  Laterality: Right;   • TUBAL COAGULATION LAPAROSCOPIC        General Information     Row Name 22 1549          Physical Therapy Time and Intention    Document Type discharge evaluation/summary  Pt. is s/p Left TKR  -MS     Mode of Treatment physical therapy; individual therapy  -MS     Row Name 22 1549          General Information    Patient Profile Reviewed yes  -MS     Prior Level of Function independent:  -MS     Barriers to Rehab none identified  -MS     Row Name 22 1549          Cognition    Orientation Status (Cognition) oriented x 3  -MS     Row Name 22 1549          Safety Issues, Functional Mobility    Comment, Safety Issues/Impairments (Mobility) Gait belt used for safety.  -MS           User Key  (r) = Recorded By, (t) = Taken By, (c) = Cosigned By    Initials Name Provider Type    Charles Coombs, PT Physical Therapist               Mobility     Row Name 22 1549          Bed Mobility    Bed Mobility supine-sit; sit-supine  -MS     Supine-Sit [place] (Bed Mobility) independent  -MS     Row Name  02/23/22 1549          Sit-Stand Transfer    Sit-Stand [place] (Transfers) independent  -MS     Assistive Device (Sit-Stand Transfers) walker, front-wheeled  -MS     Row Name 02/23/22 1549          Gait/Stairs (Locomotion)    [place] Level (Gait) standby assist  -MS     Assistive Device (Gait) walker, front-wheeled  -MS     Distance in Feet (Gait) 200 feet  -MS     [place] Level (Stairs) stand by assist  -MS     Handrail Location (Stairs) right side (ascending)  -MS     Number of Steps (Stairs) 4  -MS     Ascending Technique (Stairs) step-to-step  -MS     Descending Technique (Stairs) step-to-step  -MS     Row Name 02/23/22 1549          Mobility    Extremity Weight-bearing Status left lower extremity  -MS     Left Lower Extremity (Weight-bearing Status) weight-bearing as tolerated (WBAT)  -MS           User Key  (r) = Recorded By, (t) = Taken By, (c) = Cosigned By    Initials Name Provider Type    Charles Coombs, PT Physical Therapist               Obj/Interventions     Row Name 02/23/22 1550          Range of Motion Comprehensive    Comment, General Range of Motion BUE/RLE (WFL's)  -MS     Row Name 02/23/22 1550          Strength Comprehensive (MMT)    Comment, General Manual Muscle Testing (MMT) Assessment BUE/RLE (3+/5)  -MS     [facility] Name 02/23/22 1550          Motor Skills    Therapeutic Exercise --  Left TKR ther. ex. program x 10 reps completed  -MS           User Key  (r) = Recorded By, (t) = Taken By, (c) = Cosigned By    Initials Name Provider Type    Charles Coombs, PT Physical Therapist               Goals/Plan    No documentation.                Clinical Impression     Row Name 02/23/22 1550          Pain    Additional Documentation Pain Scale: Numbers Pre/Post-Treatment (Group)  -MS     [facility] Name 02/23/22 2130          Pain Scale: Numbers Pre/Post-Treatment    Pretreatment Pain Rating 2/10  -MS     Posttreatment Pain Rating 2/10  -MS     Pain Location - Side Left  -MS     Pain  Location knee  -MS     Row Name 02/23/22 1550          Plan of Care Review    Plan of Care Reviewed With patient  -MS     Row Name 02/23/22 1550          Positioning and Restraints    Pre-Treatment Position in bed  -MS     Post Treatment Position chair  -MS     In Chair notified nsg; reclined; sitting; call light within reach; encouraged to call for assist; with family/caregiver  -MS           User Key  (r) = Recorded By, (t) = Taken By, (c) = Cosigned By    Initials Name Provider Type    Charles Coombs, PT Physical Therapist               Outcome Measures     Row Name 02/23/22 1551          How much help from another person do you currently need...    Turning from your back to your side while in flat bed without using bedrails? 4  -MS     Moving from lying on back to sitting on the side of a flat bed without bedrails? 4  -MS     Moving to and from a bed to a chair (including a wheelchair)? 3  -MS     Standing up from a chair using your arms (e.g., wheelchair, bedside chair)? 4  -MS     Climbing 3-5 steps with a railing? 3  -MS     To walk in hospital room? 3  -MS     AM-PAC 6 Clicks Score (PT) 21  -MS     Row Name 02/23/22 1551          Functional Assessment    Outcome Measure Options AM-PAC 6 Clicks Basic Mobility (PT)  -MS           User Key  (r) = Recorded By, (t) = Taken By, (c) = Cosigned By    Initials Name Provider Type    Charles Coombs, PT Physical Therapist              Physical Therapy Education                 Title: PT OT SLP Therapies (Resolved)     Topic: Physical Therapy (Resolved)     Point: Mobility training (Resolved)     Learning Progress Summary           Patient Acceptance, E,D, VU,DU by MS at 2/23/2022 1551                   Point: Home exercise program (Resolved)     Learning Progress Summary           Patient Acceptance, E,D, VU,DU by MS at 2/23/2022 1551                   Point: Body mechanics (Resolved)     Learning Progress Summary           Patient Acceptance, E,D, VU,DU by  MS at 2/23/2022 1551                   Point: Precautions (Resolved)     Learning Progress Summary           Patient Acceptance, E,D, VU,DU by MS at 2/23/2022 1551                               User Key     Initials Effective Dates Name Provider Type Discipline    MS 06/16/21 -  Charles Goodson PT Physical Therapist PT              PT Recommendation and Plan     Plan of Care Reviewed With: patient  Outcome Summary: Pt. is currently independent/SBA with functional mobility and has no further questions/concerns regarding functional mobility or home safety.  Encouraged pt. to continue ther. ex. program 2-3 x's daily and to ambulate every 1-2 hours once home. Plan for discharge home this date.  Will sign off.     Time Calculation:    PT Charges     Row Name 02/23/22 1552             Time Calculation    Start Time 1400  -MS      Stop Time 1420  -MS      Time Calculation (min) 20 min  -MS      PT Received On 02/23/22  -MS              Time Calculation- PT    Total Timed Code Minutes- PT 19 minute(s)  -MS            User Key  (r) = Recorded By, (t) = Taken By, (c) = Cosigned By    Initials Name Provider Type    MS Charles Goodson, PT Physical Therapist              Therapy Charges for Today     Code Description Service Date Service Provider Modifiers Qty    54190841817 HC PT EVAL LOW COMPLEXITY 1 2/23/2022 Charles Goodson, PT GP 1    35563274593 HC PT THER PROC EA 15 MIN 2/23/2022 Charles Goodson, PT GP 1          PT G-Codes  Outcome Measure Options: AM-PAC 6 Clicks Basic Mobility (PT)  AM-PAC 6 Clicks Score (PT): 21    PT Discharge Summary  Anticipated Discharge Disposition (PT): home with assist, home with home health  Reason for Discharge: Discharge from facility  Discharge Destination: Home with assist, Home with home health    Charles Goodson, PT  2/23/2022

## 2022-02-24 NOTE — CASE MANAGEMENT/SOCIAL WORK
Case Management Discharge Note      Final Note: Home with OP PT.    Provided Post Acute Provider List?: N/A  N/A Provider List Comment: Declines.    Selected Continued Care - Discharged on 2/23/2022 Admission date: 2/23/2022 - Discharge disposition: Home-Health Care Svc    Destination    No services have been selected for the patient.              Durable Medical Equipment    No services have been selected for the patient.              Dialysis/Infusion    No services have been selected for the patient.              Home Medical Care    No services have been selected for the patient.              Therapy    No services have been selected for the patient.              Community Resources    No services have been selected for the patient.              Community & DME    No services have been selected for the patient.                       Final Discharge Disposition Code: 01 - home or self-care

## 2022-02-25 ENCOUNTER — TELEPHONE (OUTPATIENT)
Dept: ORTHOPEDIC SURGERY | Facility: HOSPITAL | Age: 57
End: 2022-02-25

## 2022-02-25 NOTE — TELEPHONE ENCOUNTER
Post op day 2  Discharge Instructions:  Ask patient about his or her discharge instructions  ?  Patient confirmed understanding   ?  Further instruction needed   What, if any, recommendations, teaching, or interventions did you provide? Click or tap here to enter text.  Health status:  Pain controlled Yes   Said the pain is tolerable. She had to call MD office yesterday for the tramadol because the hydrocodone makes her itch, she alternated the medications last time but he didn’t give the tramadol this time. She got the prescription yesterday and it is helping.   Recommended interventions:  Yes  Ice/Elevation, Rest/Ambulation   incision/dressing status   ?  Clean without redness, drainage, odor  ?  Redness    ?  Drainage - color Click or tap here to enter text.  ?  Odor  DARCIE - Green light blinking Choose an item.  Difficulties urination No  Last BM 2/22/2022 (if no BM by day 3-recommend OTC suppository or fleets enema)  No BM yet, she said she does have a decreased appetite. She is taking the stool softeners but will add that other drink like she did last time if need be.   Medications:  ?Medications reviewed with patient/family/caregiver  Patient taking medications as prescribed?   Yes  If not taking medications as prescribed, note specific medicine(s) and reason for each:  Click or tap here to enter text.  Hospital Follow Up Plan:  Follow up Appointment with Orthopedic surgeon:  ?Has f/u appointment                ?Scheduled f/u appointment  Home Care ordered at discharge?    No        Home Care started, or contact made?    No   If no, action taken: opted for OP PT   DME obtained/used in home?         Yes   Other information: Ms. Phillips said she is doing well. She has had the other knee done and said that everything is going as expected. She stars with OP PT today. She is walking around the house with the walker. She said she did have some increased swelling yesterday and night of surgery because of the ride  home. She has elevated it more and kept ice on her knee and that has helped with the swelling. Pain is controlled with the tramadol. Dressing has 2 small spots of blood that look old, nothing new. She was able to get sleep last night and feels better today. She is completing the exercises at least twice a day. Ms. Phillips doesn’t have any questions/concerns for me at this time. She has my contact information should she need anything.

## 2022-03-07 ENCOUNTER — TELEPHONE (OUTPATIENT)
Dept: ORTHOPEDIC SURGERY | Facility: HOSPITAL | Age: 57
End: 2022-03-07

## 2022-03-07 NOTE — TELEPHONE ENCOUNTER
Called and spoke with Ms. Phillips to see how she is doing as she is 2 weeks SP LTK. She said everything is good. She is working with PT and progressing well. She doesn't have much pain anymore and is off the pain medications for the most part. BM's are back to normal. She feels she is where she needs to be. Ms. Phillips doesn't have any questions for me at this time.She has my contact information should she need anything.

## (undated) DEVICE — PK KN TOTL 40

## (undated) DEVICE — ANTIBACTERIAL UNDYED BRAIDED (POLYGLACTIN 910), SYNTHETIC ABSORBABLE SUTURE: Brand: COATED VICRYL

## (undated) DEVICE — PENCL E/S ULTRAVAC TELESCP NOSE HOLSTR 10FT

## (undated) DEVICE — SUT VIC 0 CT1 36IN J946H

## (undated) DEVICE — DECANT BG O JET

## (undated) DEVICE — GLV SURG BIOGEL LTX PF 8

## (undated) DEVICE — TRAP FLD MINIVAC MEGADYNE 100ML

## (undated) DEVICE — NEEDLE, QUINCKE, 20GX3.5": Brand: MEDLINE

## (undated) DEVICE — BNDG ELAS ELITE V/CLOSE 4IN 5YD LF STRL

## (undated) DEVICE — DRSNG SLVR/ANTIBAC PRIMASEAL POST/OP ADHS 3.5X12IN

## (undated) DEVICE — DRAPE,U/ SHT,SPLIT,PLAS,STERIL: Brand: MEDLINE

## (undated) DEVICE — SOL ISO/ALC RUB 70PCT 4OZ

## (undated) DEVICE — ADHS SKIN SURG TISS VISC PREMIERPRO EXOFIN HI/VISC FAST/DRY

## (undated) DEVICE — GLV SURG PREMIERPRO ORTHO LTX PF SZ8 BRN

## (undated) DEVICE — STERILE PATIENT PROTECTIVE PAD FOR IMP® KNEE POSITIONERS & COHESIVE WRAP (10 / CASE): Brand: DE MAYO KNEE POSITIONER®

## (undated) DEVICE — SUT ETHIB 1 CT1 30IN  X425H

## (undated) DEVICE — RECIPROCATING BLADE, DOUBLE SIDED, OFFSET  (70.0 X 0.64 X 12.6MM)

## (undated) DEVICE — GLV SURG SIGNATURE ESSENTIAL PF LTX SZ8

## (undated) DEVICE — SOL IRR NACL 0.9PCT 3000ML

## (undated) DEVICE — BNDG ELAS ELITE V/CLOSE 6IN 5YD LF STRL

## (undated) DEVICE — MAT FLR ABSORBENT LG 4FT 10 2.5FT

## (undated) DEVICE — STRAP STIRUP WO/ RNG

## (undated) DEVICE — 3M™ IOBAN™ 2 ANTIMICROBIAL INCISE DRAPE 6640EZ: Brand: IOBAN™ 2

## (undated) DEVICE — APPL CHLORAPREP HI/LITE 26ML ORNG

## (undated) DEVICE — DUAL CUT SAGITTAL BLADE

## (undated) DEVICE — TBG PENCL TELESCP MEGADYNE SMOKE EVAC 10FT

## (undated) DEVICE — TRY SKINPREP DRYPREP